# Patient Record
Sex: MALE | Race: BLACK OR AFRICAN AMERICAN | Employment: UNEMPLOYED | URBAN - METROPOLITAN AREA
[De-identification: names, ages, dates, MRNs, and addresses within clinical notes are randomized per-mention and may not be internally consistent; named-entity substitution may affect disease eponyms.]

---

## 2017-01-02 RX ORDER — OMEPRAZOLE 20 MG/1
CAPSULE, DELAYED RELEASE ORAL
Qty: 30 CAP | Refills: 5 | Status: SHIPPED | OUTPATIENT
Start: 2017-01-02 | End: 2017-07-03 | Stop reason: SDUPTHER

## 2017-01-11 ENCOUNTER — OFFICE VISIT (OUTPATIENT)
Dept: FAMILY MEDICINE CLINIC | Age: 52
End: 2017-01-11

## 2017-01-11 VITALS
BODY MASS INDEX: 32.73 KG/M2 | OXYGEN SATURATION: 98 % | RESPIRATION RATE: 12 BRPM | DIASTOLIC BLOOD PRESSURE: 80 MMHG | HEART RATE: 84 BPM | HEIGHT: 69 IN | TEMPERATURE: 98.7 F | SYSTOLIC BLOOD PRESSURE: 149 MMHG | WEIGHT: 221 LBS

## 2017-01-11 DIAGNOSIS — I10 ESSENTIAL HYPERTENSION: Primary | ICD-10-CM

## 2017-01-11 RX ORDER — AMLODIPINE AND BENAZEPRIL HYDROCHLORIDE 5; 20 MG/1; MG/1
1 CAPSULE ORAL DAILY
Qty: 30 CAP | Refills: 1 | Status: SHIPPED | OUTPATIENT
Start: 2017-01-11 | End: 2017-02-08 | Stop reason: DRUGHIGH

## 2017-01-11 RX ORDER — OLANZAPINE 5 MG/1
TABLET ORAL
COMMUNITY
End: 2018-09-14

## 2017-01-11 RX ORDER — HYDROCORTISONE 25 MG/G
CREAM TOPICAL 2 TIMES DAILY
COMMUNITY
End: 2017-08-21 | Stop reason: SDUPTHER

## 2017-01-11 NOTE — MR AVS SNAPSHOT
Visit Information Date & Time Provider Department Dept. Phone Encounter #  
 1/11/2017  8:30 AM Cosme Martinez NP [facility] [phone] [id number] Follow-up Instructions Return in about 1 month (around 2/11/2017) for HTN F/U . Upcoming Health Maintenance Date Due COLONOSCOPY 12/3/2025 DTaP/Tdap/Td series (2 - Td) 10/6/2026 Allergies as of 1/11/2017  Review Complete On: 1/11/2017 By: Roel Andrea LPN No Known Allergies Current Immunizations  Reviewed on 10/27/2015 Name Date Influenza Vaccine (Quad) PF 10/6/2016 PPD 1/25/2012, 9/19/2011, 8/2/2010 TB Skin Test (PPD) Intradermal 10/27/2015, 9/9/2013 Not reviewed this visit You Were Diagnosed With   
  
 Codes Comments Essential hypertension    -  Primary ICD-10-CM: I10 
ICD-9-CM: 401.9 Vitals BP Pulse Temp Resp Height(growth percentile) Weight(growth percentile) 149/80 84 98.7 °F (37.1 °C) 12 5' 9\" (1.753 m) 221 lb (100.2 kg) SpO2 BMI Smoking Status 98% 32.64 kg/m2 Former Smoker Vitals History BMI and BSA Data Body Mass Index Body Surface Area  
 32.64 kg/m 2 2.21 m 2 Preferred Pharmacy Pharmacy Name Phone [facility]  [phone] Your Updated Medication List  
  
   
This list is accurate as of: 1/11/17  8:31 AM.  Always use your most recent med list. amLODIPine-benazepril 5-20 mg per capsule Commonly known as:  Issa Ban Take 1 Cap by mouth daily. aspirin delayed-release 81 mg tablet Commonly known as:  ASPIR-LOW TAKE 1 TABLET BY MOUTH ONCE A DAY  
  
 clobetasol 0.05 % lotion Commonly known as:  CLOBEX  
APPLY TO AFFECTED AREA 2 TIMES A DAY AS DIRECTED. DEPAKOTE  mg ER tablet Generic drug:  divalproex ER Take  by mouth.  
  
 escitalopram oxalate 20 mg tablet Commonly known as:  Kary Cruz Take 1 Tab by mouth daily. Gauze Bandage 4 X 4 \" Bndg  
1 Each by Apply Externally route two (2) times a day. hydrocortisone 2.5 % topical cream  
Commonly known as:  HYTONE Apply  to affected area two (2) times a day. use thin layer  
  
 ketoconazole 2 % shampoo Commonly known as:  NIZORAL Apply  to affected area daily as needed. * LORazepam 1 mg tablet Commonly known as:  ATIVAN Take 1 Tab by mouth three (3) times daily. * LORazepam 2 mg tablet Commonly known as:  ATIVAN Take 2 tablets by mouth nightly as needed for Anxiety. MAX per day is 10mg  
  
 mometasone 0.1 % topical cream  
Commonly known as:  Jacobson Harrow Apply  to affected area daily. multivitamin with iron tablet Commonly known as:  TAB-A-SANTANA/IRON  
TAKE 1 TABLET BY MOUTH DAILY  
  
 mupirocin 2 % ointment Commonly known as:  [employer] Apply  to affected area daily as needed. * OLANZapine 15 mg tablet Commonly known as:  ZyPREXA Take 15 mg by mouth nightly. * OLANZapine 5 mg tablet Commonly known as:  ZyPREXA Take  by mouth nightly. omeprazole 20 mg capsule Commonly known as:  PRILOSEC  
TAKE ONE CAPSULE BY MOUTH DAILY  
  
 OTHER Attends extra large pull-up, use as needed. Dx: 318.0  
  
 * polyethylene glycol 17 gram packet Commonly known as:  Deliliah Hefty Take 1 Packet by mouth daily. * polyethylene glycol 17 gram/dose powder Commonly known as:  Deliliah Hefty MIX 17GM AS DIRECTED THREE TIMES A WEEK. QUEtiapine 400 mg tablet Commonly known as:  SEROquel  
  
 risperiDONE 4 mg tablet Commonly known as:  RisperDAL Take  by mouth.  
  
 sucralfate 1 gram tablet Commonly known as:  CARAFATE  
TAKE (1) TABLET BY MOUTH THREE TIMES DAILY. Therapy M 9 mg iron-400 mcg Tab tablet Generic drug:  multivitamin, tx-iron-ca-min TAKE ONE TABLET BY MOUTH DAILY. * Notice:   This list has 6 medication(s) that are the same as other medications prescribed for you. Read the directions carefully, and ask your doctor or other care provider to review them with you. Prescriptions Sent to Pharmacy Refills  
 amLODIPine-benazepril (LOTREL) 5-20 mg per capsule 1 Sig: Take 1 Cap by mouth daily. Class: Normal  
 Pharmacy: [facility]  #: [phone] Route: Oral  
  
Follow-up Instructions Return in about 1 month (around 2/11/2017) for HTN F/U . Patient Instructions High Blood Pressure: Care Instructions Your Care Instructions If your blood pressure is usually above 140/90, you have high blood pressure, or hypertension. That means the top number is 140 or higher or the bottom number is 90 or higher, or both. Despite what a lot of people think, high blood pressure usually doesn't cause headaches or make you feel dizzy or lightheaded. It usually has no symptoms. But it does increase your risk for heart attack, stroke, and kidney or eye damage. The higher your blood pressure, the more your risk increases. Your doctor will give you a goal for your blood pressure. Your goal will be based on your health and your age. An example of a goal is to keep your blood pressure below 140/90. Lifestyle changes, such as eating healthy and being active, are always important to help lower blood pressure. You might also take medicine to reach your blood pressure goal. 
Follow-up care is a key part of your treatment and safety. Be sure to make and go to all appointments, and call your doctor if you are having problems. It's also a good idea to know your test results and keep a list of the medicines you take. How can you care for yourself at home? Medical treatment · If you stop taking your medicine, your blood pressure will go back up. You may take one or more types of medicine to lower your blood pressure. Be safe with medicines. Take your medicine exactly as prescribed. Call your doctor if you think you are having a problem with your medicine. · Talk to your doctor before you start taking aspirin every day. Aspirin can help certain people lower their risk of a heart attack or stroke. But taking aspirin isn't right for everyone, because it can cause serious bleeding. · See your doctor regularly. You may need to see the doctor more often at first or until your blood pressure comes down. · If you are taking blood pressure medicine, talk to your doctor before you take decongestants or anti-inflammatory medicine, such as ibuprofen. Some of these medicines can raise blood pressure. · Learn how to check your blood pressure at home. Lifestyle changes · Stay at a healthy weight. This is especially important if you put on weight around the waist. Losing even 10 pounds can help you lower your blood pressure. · If your doctor recommends it, get more exercise. Walking is a good choice. Bit by bit, increase the amount you walk every day. Try for at least 30 minutes on most days of the week. You also may want to swim, bike, or do other activities. · Avoid or limit alcohol. Talk to your doctor about whether you can drink any alcohol. · Try to limit how much sodium you eat to less than 2,300 milligrams (mg) a day. Your doctor may ask you to try to eat less than 1,500 mg a day. · Eat plenty of fruits (such as bananas and oranges), vegetables, legumes, whole grains, and low-fat dairy products. · Lower the amount of saturated fat in your diet. Saturated fat is found in animal products such as milk, cheese, and meat. Limiting these foods may help you lose weight and also lower your risk for heart disease. · Do not smoke. Smoking increases your risk for heart attack and stroke. If you need help quitting, talk to your doctor about stop-smoking programs and medicines. These can increase your chances of quitting for good. When should you call for help? Call 911 anytime you think you may need emergency care. This may mean having symptoms that suggest that your blood pressure is causing a serious heart or blood vessel problem. Your blood pressure may be over 180/110. For example, call 911 if: 
· You have symptoms of a heart attack. These may include: ¨ Chest pain or pressure, or a strange feeling in the chest. 
¨ Sweating. ¨ Shortness of breath. ¨ Nausea or vomiting. ¨ Pain, pressure, or a strange feeling in the back, neck, jaw, or upper belly or in one or both shoulders or arms. ¨ Lightheadedness or sudden weakness. ¨ A fast or irregular heartbeat. · You have symptoms of a stroke. These may include: 
¨ Sudden numbness, tingling, weakness, or loss of movement in your face, arm, or leg, especially on only one side of your body. ¨ Sudden vision changes. ¨ Sudden trouble speaking. ¨ Sudden confusion or trouble understanding simple statements. ¨ Sudden problems with walking or balance. ¨ A sudden, severe headache that is different from past headaches. · You have severe back or belly pain. Do not wait until your blood pressure comes down on its own. Get help right away. Call your doctor now or seek immediate care if: 
· Your blood pressure is much higher than normal (such as 180/110 or higher), but you don't have symptoms. · You think high blood pressure is causing symptoms, such as: ¨ Severe headache. ¨ Blurry vision. Watch closely for changes in your health, and be sure to contact your doctor if: 
· Your blood pressure measures 140/90 or higher at least 2 times. That means the top number is 140 or higher or the bottom number is 90 or higher, or both. · You think you may be having side effects from your blood pressure medicine. · Your blood pressure is usually normal, but it goes above normal at least 2 times. Where can you learn more? Go to http://jaren-chucho.info/. Enter H616 in the search box to learn more about \"High Blood Pressure: Care Instructions. \" Current as of: August 8, 2016 Content Version: 11.1 © 8473-4383 [employer]. Care instructions adapted under license by [employer] (which disclaims liability or warranty for this information). If you have questions about a medical condition or this instruction, always ask your healthcare professional. [address] any warranty or liability for your use of this information. Introducing [facility]! Dear Allison Ghosh: Thank you for requesting a Downstream account. Our records indicate that you have previously registered for a Downstream account but its currently inactive. Please call our Downstream support line at [phone]. Additional Information If you have questions, please visit the Frequently Asked Questions section of the Downstream website at [email or web address] Remember, Downstream is NOT to be used for urgent needs. For medical emergencies, dial 911. Now available from your iPhone and Android! Please provide this summary of care documentation to your next provider. Your primary care clinician is listed as ALEXIS KIDD. If you have any questions after today's visit, please call [phone].

## 2017-01-11 NOTE — PATIENT INSTRUCTIONS

## 2017-01-11 NOTE — PROGRESS NOTES
Chief Complaint   Patient presents with    Blood Pressure Check     he is a 46y.o. year old male who presents for evalution. Pt here for HTN F/U. Medication increased at last visit. Has long of BP readings from morning - majority are above 140/90. Pt has not seemed to have problems with chest pain, SOB, dizziness, or vision changes. Reviewed PmHx, RxHx, FmHx, SocHx, AllgHx and updated and dated in the chart. Review of Systems - negative except as listed above in the HPI    Objective:     Vitals:    01/11/17 0812   BP: 149/80   Pulse: 84   Resp: 12   Temp: 98.7 °F (37.1 °C)   SpO2: 98%   Weight: 221 lb (100.2 kg)   Height: 5' 9\" (1.753 m)     Physical Examination: General appearance - alert, well appearing, and in no distress  Chest - clear to auscultation, no wheezes, rales or rhonchi, symmetric air entry  Heart - normal rate, regular rhythm, normal S1, S2, no murmurs, rubs, clicks or gallops  Extremities - peripheral pulses normal, no pedal edema, no clubbing or cyanosis    Assessment/ Plan:   Taryn Conde was seen today for blood pressure check. Diagnoses and all orders for this visit:    Essential hypertension  -     amLODIPine-benazepril (LOTREL) 5-20 mg per capsule; Take 1 Cap by mouth daily. New rx, D/C Lisinopril. F/U 1 mo. Encouraged pt to monitor BP at home, preferably in AM when first wakes up. Goal BP is < 130/90 if on meds. Discussed consequences of uncontrolled HTN and need for yearly eye exam. Recommended pt limit salt intake and engage in regular exercise. Pt voiced understanding regarding plan of care. Follow-up Disposition:  Return in about 1 month (around 2/11/2017) for HTN F/U . I have discussed the diagnosis with the patient and the intended plan as seen in the above orders. The patient has received an after-visit summary and questions were answered concerning future plans.      Medication Side Effects and Warnings were discussed with patient    Diego Lawson, NP

## 2017-01-23 ENCOUNTER — DOCUMENTATION ONLY (OUTPATIENT)
Dept: FAMILY MEDICINE CLINIC | Age: 52
End: 2017-01-23

## 2017-01-25 ENCOUNTER — DOCUMENTATION ONLY (OUTPATIENT)
Dept: FAMILY MEDICINE CLINIC | Age: 52
End: 2017-01-25

## 2017-02-01 ENCOUNTER — DOCUMENTATION ONLY (OUTPATIENT)
Dept: FAMILY MEDICINE CLINIC | Age: 52
End: 2017-02-01

## 2017-02-02 ENCOUNTER — DOCUMENTATION ONLY (OUTPATIENT)
Dept: FAMILY MEDICINE CLINIC | Age: 52
End: 2017-02-02

## 2017-02-03 ENCOUNTER — DOCUMENTATION ONLY (OUTPATIENT)
Dept: FAMILY MEDICINE CLINIC | Age: 52
End: 2017-02-03

## 2017-02-06 ENCOUNTER — DOCUMENTATION ONLY (OUTPATIENT)
Dept: FAMILY MEDICINE CLINIC | Age: 52
End: 2017-02-06

## 2017-02-06 RX ORDER — MULTIPLE VITAMINS W/ MINERALS TAB 9MG-400MCG
TAB ORAL
Qty: 30 TAB | Refills: 2 | Status: SHIPPED | OUTPATIENT
Start: 2017-02-06 | End: 2017-10-16 | Stop reason: SDUPTHER

## 2017-02-08 ENCOUNTER — OFFICE VISIT (OUTPATIENT)
Dept: FAMILY MEDICINE CLINIC | Age: 52
End: 2017-02-08

## 2017-02-08 VITALS
DIASTOLIC BLOOD PRESSURE: 77 MMHG | RESPIRATION RATE: 12 BRPM | OXYGEN SATURATION: 98 % | SYSTOLIC BLOOD PRESSURE: 125 MMHG | WEIGHT: 228 LBS | BODY MASS INDEX: 33.77 KG/M2 | TEMPERATURE: 97.9 F | HEART RATE: 89 BPM | HEIGHT: 69 IN

## 2017-02-08 DIAGNOSIS — I10 ESSENTIAL HYPERTENSION: Primary | ICD-10-CM

## 2017-02-08 RX ORDER — AMLODIPINE AND BENAZEPRIL HYDROCHLORIDE 10; 20 MG/1; MG/1
1 CAPSULE ORAL DAILY
Qty: 30 CAP | Refills: 1 | Status: SHIPPED | OUTPATIENT
Start: 2017-02-08 | End: 2017-05-03 | Stop reason: SDUPTHER

## 2017-02-08 NOTE — PROGRESS NOTES
Chief Complaint   Patient presents with    Follow Up Chronic Condition    Blood Pressure Check     he is a 46y.o. year old male who presents for evalution. Pt here for HTN F/U. Medication changed at last appt. Numbers continue to be elevated at home - mostly 140s/high 90s. Well controlled here. No complaints of chest pain or SOB. Pt very grumpy today, doesn't want to be here. Reviewed PmHx, RxHx, FmHx, SocHx, AllgHx and updated and dated in the chart. Review of Systems - negative except as listed above in the HPI    Objective:     Vitals:    02/08/17 0756   BP: 125/77   Pulse: 89   Resp: 12   Temp: 97.9 °F (36.6 °C)   SpO2: 98%   Weight: 228 lb (103.4 kg)   Height: 5' 9\" (1.753 m)     Physical Examination: General appearance - alert, well appearing, and in no distress  Chest - clear to auscultation, no wheezes, rales or rhonchi, symmetric air entry  Heart - normal rate, regular rhythm, normal S1, S2, no murmurs, rubs, clicks or gallops    Assessment/ Plan:   Lara Bowie was seen today for follow up chronic condition and blood pressure check. Diagnoses and all orders for this visit:    Essential hypertension  -     amLODIPine-benazepril (LOTREL) 10-20 mg per capsule; Take 1 Cap by mouth daily. Encouraged pt to monitor BP at home, preferably in AM when first wakes up. Goal BP is < 130/90 if on meds. Discussed consequences of uncontrolled HTN and need for yearly eye exam. Recommended pt limit salt intake and engage in regular exercise. F/U 1 mo. Pt voiced understanding regarding plan of care. Follow-up Disposition:  Return in about 1 month (around 3/8/2017) for HTN. I have discussed the diagnosis with the patient and the intended plan as seen in the above orders. The patient has received an after-visit summary and questions were answered concerning future plans.      Medication Side Effects and Warnings were discussed with patient    Forest Oneal NP

## 2017-02-08 NOTE — MR AVS SNAPSHOT
Visit Information Date & Time Provider Department Dept. Phone Encounter #  
 2/8/2017  7:45 AM Emerson Sills, ABBY [address] [facility] [phone] [id number] Follow-up Instructions Return in about 1 month (around 3/8/2017) for HTN. Upcoming Health Maintenance Date Due COLONOSCOPY 12/3/2025 DTaP/Tdap/Td series (2 - Td) 10/6/2026 Allergies as of 2/8/2017  Review Complete On: 2/8/2017 By: Celso Cedeno LPN No Known Allergies Current Immunizations  Reviewed on 10/27/2015 Name Date Influenza Vaccine (Quad) PF 10/6/2016 PPD 1/25/2012, 9/19/2011, 8/2/2010 TB Skin Test (PPD) Intradermal 10/27/2015, 9/9/2013 Not reviewed this visit You Were Diagnosed With   
  
 Codes Comments Essential hypertension    -  Primary ICD-10-CM: I10 
ICD-9-CM: 401.9 Vitals BP Pulse Temp Resp Height(growth percentile) Weight(growth percentile) 125/77 89 97.9 °F (36.6 °C) 12 5' 9\" (1.753 m) 228 lb (103.4 kg) SpO2 BMI Smoking Status 98% 33.67 kg/m2 Former Smoker Vitals History BMI and BSA Data Body Mass Index Body Surface Area  
 33.67 kg/m 2 2.24 m 2 Preferred Pharmacy Pharmacy Name Phone [facility] [phone] Your Updated Medication List  
  
   
This list is accurate as of: 2/8/17  8:08 AM.  Always use your most recent med list. amLODIPine-benazepril 10-20 mg per capsule Commonly known as:  Laura Furlough Take 1 Cap by mouth daily. aspirin delayed-release 81 mg tablet Commonly known as:  ASPIR-LOW TAKE 1 TABLET BY MOUTH ONCE A DAY  
  
 clobetasol 0.05 % lotion Commonly known as:  CLOBEX  
APPLY TO AFFECTED AREA 2 TIMES A DAY AS DIRECTED. DEPAKOTE  mg ER tablet Generic drug:  divalproex ER Take  by mouth.  
  
 escitalopram oxalate 20 mg tablet Commonly known as:  Arty Heflin Take 1 Tab by mouth daily. Gauze Bandage 4 X 4 \" Bndg  
1 Each by Apply Externally route two (2) times a day. hydrocortisone 2.5 % topical cream  
Commonly known as:  HYTONE Apply  to affected area two (2) times a day. use thin layer  
  
 ketoconazole 2 % shampoo Commonly known as:  NIZORAL Apply  to affected area daily as needed. * LORazepam 1 mg tablet Commonly known as:  ATIVAN Take 1 Tab by mouth three (3) times daily. * LORazepam 2 mg tablet Commonly known as:  ATIVAN Take 2 tablets by mouth nightly as needed for Anxiety. MAX per day is 10mg  
  
 mometasone 0.1 % topical cream  
Commonly known as:  Geri July Apply  to affected area daily. multivitamin with iron tablet Commonly known as:  TAB-A-SANTANA/IRON  
TAKE 1 TABLET BY MOUTH DAILY  
  
 mupirocin 2 % ointment Commonly known as:  [facility] Apply  to affected area daily as needed. * OLANZapine 15 mg tablet Commonly known as:  ZyPREXA Take 15 mg by mouth nightly. * OLANZapine 5 mg tablet Commonly known as:  ZyPREXA Take  by mouth nightly. omeprazole 20 mg capsule Commonly known as:  PRILOSEC  
TAKE ONE CAPSULE BY MOUTH DAILY  
  
 OTHER Attends extra large pull-up, use as needed. Dx: 318.0  
  
 polyethylene glycol 17 gram packet Commonly known as:  Ethan Armor Take 1 Packet by mouth daily. QUEtiapine 400 mg tablet Commonly known as:  SEROquel  
  
 risperiDONE 4 mg tablet Commonly known as:  RisperDAL Take  by mouth.  
  
 sucralfate 1 gram tablet Commonly known as:  CARAFATE  
TAKE (1) TABLET BY MOUTH THREE TIMES DAILY. Therapy M 9 mg iron-400 mcg Tab tablet Generic drug:  multivitamin, tx-iron-ca-min TAKE ONE TABLET BY MOUTH DAILY. * Notice: This list has 4 medication(s) that are the same as other medications prescribed for you. Read the directions carefully, and ask your doctor or other care provider to review them with you. Prescriptions Sent to Pharmacy Refills  
 amLODIPine-benazepril (LOTREL) 10-20 mg per capsule 1 Sig: Take 1 Cap by mouth daily. Class: Normal  
 Pharmacy: [facility] #: [phone] Route: Oral  
  
Follow-up Instructions Return in about 1 month (around 3/8/2017) for HTN. Patient Instructions Learning About High Blood Pressure What is high blood pressure? Blood pressure is a measure of how hard the blood pushes against the walls of your arteries. It's normal for blood pressure to go up and down throughout the day, but if it stays up, you have high blood pressure. Another name for high blood pressure is hypertension. Two numbers tell you your blood pressure. The first number is the systolic pressure. It shows how hard the blood pushes when your heart is pumping. The second number is the diastolic pressure. It shows how hard the blood pushes between heartbeats, when your heart is relaxed and filling with blood. A blood pressure of less than 120/80 (say \"120 over 80\") is ideal for an adult. High blood pressure is 140/90 or higher. You have high blood pressure if your top number is 140 or higher or your bottom number is 90 or higher, or both. Many people fall into the category in between, called prehypertension. People with prehypertension need to make lifestyle changes to bring their blood pressure down and help prevent or delay high blood pressure. What happens when you have high blood pressure? · Blood flows through your arteries with too much force. Over time, this damages the walls of your arteries. But you can't feel it. High blood pressure usually doesn't cause symptoms. · Fat and calcium start to build up in your arteries. This buildup is called plaque. Plaque makes your arteries narrower and stiffer. Blood can't flow through them as easily.  
· This lack of good blood flow starts to damage some of the organs in your body. This can lead to problems such as coronary artery disease and heart attack, heart failure, stroke, kidney failure, and eye damage. How can you prevent high blood pressure? · Stay at a healthy weight. · Try to limit how much sodium you eat to less than 2,300 milligrams (mg) a day. If you limit your sodium to 1,500 mg a day, you can lower your blood pressure even more. ¨ Buy foods that are labeled \"unsalted,\" \"sodium-free,\" or \"low-sodium. \" Foods labeled \"reduced-sodium\" and \"light sodium\" may still have too much sodium. ¨ Flavor your food with garlic, lemon juice, onion, vinegar, herbs, and spices instead of salt. Do not use soy sauce, steak sauce, onion salt, garlic salt, mustard, or ketchup on your food. ¨ Use less salt (or none) when recipes call for it. You can often use half the salt a recipe calls for without losing flavor. · Be physically active. Get at least 30 minutes of exercise on most days of the week. Walking is a good choice. You also may want to do other activities, such as running, swimming, cycling, or playing tennis or team sports. · Limit alcohol to 2 drinks a day for men and 1 drink a day for women. · Eat plenty of fruits, vegetables, and low-fat dairy products. Eat less saturated and total fats. How is high blood pressure treated? · Your doctor will suggest making lifestyle changes. For example, your doctor may ask you to eat healthy foods, quit smoking, lose extra weight, and be more active. · If lifestyle changes don't help enough or your blood pressure is very high, you will have to take medicine every day. Follow-up care is a key part of your treatment and safety. Be sure to make and go to all appointments, and call your doctor if you are having problems. It's also a good idea to know your test results and keep a list of the medicines you take. Where can you learn more? Go to http://pranav.info/. Enter P501 in the search box to learn more about \"Learning About High Blood Pressure. \" Current as of: March 23, 2016 Content Version: 11.1 © 9238-9441 [employer]. Care instructions adapted under license by [employer] (which disclaims liability or warranty for this information). If you have questions about a medical condition or this instruction, always ask your healthcare professional. [address] any warranty or liability for your use of this information. Introducing [facility]! Dear Dot Frankel: Thank you for requesting a Exabre account. Our records indicate that you have previously registered for a Exabre account but its currently inactive. Please call our Exabre support line at [phone]. Additional Information If you have questions, please visit the Frequently Asked Questions section of the Exabre website at [email or web address] Remember, Exabre is NOT to be used for urgent needs. For medical emergencies, dial 911. Now available from your iPhone and Android! Please provide this summary of care documentation to your next provider. Your primary care clinician is listed as ALEXIS KIDD. If you have any questions after today's visit, please call [phone].

## 2017-02-08 NOTE — PATIENT INSTRUCTIONS
Learning About High Blood Pressure  What is high blood pressure? Blood pressure is a measure of how hard the blood pushes against the walls of your arteries. It's normal for blood pressure to go up and down throughout the day, but if it stays up, you have high blood pressure. Another name for high blood pressure is hypertension. Two numbers tell you your blood pressure. The first number is the systolic pressure. It shows how hard the blood pushes when your heart is pumping. The second number is the diastolic pressure. It shows how hard the blood pushes between heartbeats, when your heart is relaxed and filling with blood. A blood pressure of less than 120/80 (say \"120 over 80\") is ideal for an adult. High blood pressure is 140/90 or higher. You have high blood pressure if your top number is 140 or higher or your bottom number is 90 or higher, or both. Many people fall into the category in between, called prehypertension. People with prehypertension need to make lifestyle changes to bring their blood pressure down and help prevent or delay high blood pressure. What happens when you have high blood pressure? · Blood flows through your arteries with too much force. Over time, this damages the walls of your arteries. But you can't feel it. High blood pressure usually doesn't cause symptoms. · Fat and calcium start to build up in your arteries. This buildup is called plaque. Plaque makes your arteries narrower and stiffer. Blood can't flow through them as easily. · This lack of good blood flow starts to damage some of the organs in your body. This can lead to problems such as coronary artery disease and heart attack, heart failure, stroke, kidney failure, and eye damage. How can you prevent high blood pressure? · Stay at a healthy weight. · Try to limit how much sodium you eat to less than 2,300 milligrams (mg) a day.  If you limit your sodium to 1,500 mg a day, you can lower your blood pressure even more.  ¨ Buy foods that are labeled \"unsalted,\" \"sodium-free,\" or \"low-sodium. \" Foods labeled \"reduced-sodium\" and \"light sodium\" may still have too much sodium. ¨ Flavor your food with garlic, lemon juice, onion, vinegar, herbs, and spices instead of salt. Do not use soy sauce, steak sauce, onion salt, garlic salt, mustard, or ketchup on your food. ¨ Use less salt (or none) when recipes call for it. You can often use half the salt a recipe calls for without losing flavor. · Be physically active. Get at least 30 minutes of exercise on most days of the week. Walking is a good choice. You also may want to do other activities, such as running, swimming, cycling, or playing tennis or team sports. · Limit alcohol to 2 drinks a day for men and 1 drink a day for women. · Eat plenty of fruits, vegetables, and low-fat dairy products. Eat less saturated and total fats. How is high blood pressure treated? · Your doctor will suggest making lifestyle changes. For example, your doctor may ask you to eat healthy foods, quit smoking, lose extra weight, and be more active. · If lifestyle changes don't help enough or your blood pressure is very high, you will have to take medicine every day. Follow-up care is a key part of your treatment and safety. Be sure to make and go to all appointments, and call your doctor if you are having problems. It's also a good idea to know your test results and keep a list of the medicines you take. Where can you learn more? Go to http://jaren-chucho.info/. Enter P501 in the search box to learn more about \"Learning About High Blood Pressure. \"  Current as of: March 23, 2016  Content Version: 11.1  © 9274-4697 [employer]. Care instructions adapted under license by [employer] (which disclaims liability or warranty for this information).  If you have questions about a medical condition or this instruction, always ask your healthcare professional. [employer] disclaims any warranty or liability for your use of this information.

## 2017-02-16 RX ORDER — CLOBETASOL PROPIONATE 0.5 MG/ML
LOTION TOPICAL
Qty: 50 ML | Refills: 5 | Status: SHIPPED | OUTPATIENT
Start: 2017-02-16 | End: 2017-08-18 | Stop reason: SDUPTHER

## 2017-02-16 RX ORDER — POLYETHYLENE GLYCOL 3350 17 G/17G
POWDER, FOR SOLUTION ORAL
Qty: 527 G | Refills: 5 | Status: SHIPPED | OUTPATIENT
Start: 2017-02-16 | End: 2017-08-18 | Stop reason: SDUPTHER

## 2017-03-08 ENCOUNTER — HOSPITAL ENCOUNTER (OUTPATIENT)
Dept: LAB | Age: 52
Discharge: HOME | End: 2017-03-08
Payer: MEDICARE

## 2017-03-08 ENCOUNTER — OFFICE VISIT (OUTPATIENT)
Dept: FAMILY MEDICINE CLINIC | Age: 52
End: 2017-03-08

## 2017-03-08 VITALS
OXYGEN SATURATION: 99 % | SYSTOLIC BLOOD PRESSURE: 139 MMHG | WEIGHT: 227 LBS | BODY MASS INDEX: 33.62 KG/M2 | HEART RATE: 88 BPM | TEMPERATURE: 98.7 F | DIASTOLIC BLOOD PRESSURE: 81 MMHG | RESPIRATION RATE: 12 BRPM | HEIGHT: 69 IN

## 2017-03-08 DIAGNOSIS — I10 ESSENTIAL HYPERTENSION: Primary | ICD-10-CM

## 2017-03-08 DIAGNOSIS — E78.5 HYPERLIPIDEMIA, UNSPECIFIED HYPERLIPIDEMIA TYPE: ICD-10-CM

## 2017-03-08 DIAGNOSIS — L98.9 SKIN DISORDER: ICD-10-CM

## 2017-03-08 PROCEDURE — 80061 LIPID PANEL: CPT

## 2017-03-08 PROCEDURE — 80053 COMPREHEN METABOLIC PANEL: CPT

## 2017-03-08 RX ORDER — CLOTRIMAZOLE AND BETAMETHASONE DIPROPIONATE 10; .64 MG/G; MG/G
CREAM TOPICAL
Qty: 30 G | Refills: 0 | Status: SHIPPED | OUTPATIENT
Start: 2017-03-08 | End: 2018-01-31

## 2017-03-08 NOTE — MR AVS SNAPSHOT
Visit Information Date & Time Provider Department Dept. Phone Encounter #  
 3/8/2017  7:45 AM Aimee Trammell NP [address] [facility] [phone] [id number] Upcoming Health Maintenance Date Due COLONOSCOPY 12/3/2025 DTaP/Tdap/Td series (2 - Td) 10/6/2026 Allergies as of 3/8/2017  Review Complete On: 3/8/2017 By: Margene Angelucci, LPN No Known Allergies Current Immunizations  Reviewed on 10/27/2015 Name Date Influenza Vaccine (Quad) PF 10/6/2016 PPD 1/25/2012, 9/19/2011, 8/2/2010 TB Skin Test (PPD) Intradermal 10/27/2015, 9/9/2013 Not reviewed this visit You Were Diagnosed With   
  
 Codes Comments Essential hypertension    -  Primary ICD-10-CM: I10 
ICD-9-CM: 401.9 Hyperlipidemia, unspecified hyperlipidemia type     ICD-10-CM: E78.5 ICD-9-CM: 272.4 Skin disorder     ICD-10-CM: L98.9 ICD-9-CM: 709.9 Vitals BP Pulse Temp Resp Height(growth percentile) Weight(growth percentile) 139/81 88 98.7 °F (37.1 °C) 12 5' 9\" (1.753 m) 227 lb (103 kg) SpO2 BMI Smoking Status 99% 33.52 kg/m2 Former Smoker Vitals History BMI and BSA Data Body Mass Index Body Surface Area  
 33.52 kg/m 2 2.24 m 2 Preferred Pharmacy Pharmacy Name Phone [facility]  [phone] Your Updated Medication List  
  
   
This list is accurate as of: 3/8/17  8:15 AM.  Always use your most recent med list. amLODIPine-benazepril 10-20 mg per capsule Commonly known as:  Margarite Chavez Take 1 Cap by mouth daily. aspirin delayed-release 81 mg tablet Commonly known as:  ASPIR-LOW TAKE 1 TABLET BY MOUTH ONCE A DAY  
  
 clobetasol 0.05 % lotion Commonly known as:  CLOBEX  
APPLY TO AFFECTED AREA 2 TIMES A DAY AS DIRECTED.   
  
 clotrimazole-betamethasone topical cream  
Commonly known as:  Steve Eugene  
 Apply lotion to bilateral thighs BID x 1-2 weeks DEPAKOTE  mg ER tablet Generic drug:  divalproex ER Take  by mouth.  
  
 escitalopram oxalate 20 mg tablet Commonly known as:  Arturo Hands Take 1 Tab by mouth daily. Gauze Bandage 4 X 4 \" Bndg  
1 Each by Apply Externally route two (2) times a day. hydrocortisone 2.5 % topical cream  
Commonly known as:  HYTONE Apply  to affected area two (2) times a day. use thin layer  
  
 ketoconazole 2 % shampoo Commonly known as:  NIZORAL Apply  to affected area daily as needed. * LORazepam 1 mg tablet Commonly known as:  ATIVAN Take 1 Tab by mouth three (3) times daily. * LORazepam 2 mg tablet Commonly known as:  ATIVAN Take 2 tablets by mouth nightly as needed for Anxiety. MAX per day is 10mg  
  
 mometasone 0.1 % topical cream  
Commonly known as:  Lindsey Gunner Apply  to affected area daily. multivitamin with iron tablet Commonly known as:  TAB-A-SANTANA/IRON  
TAKE 1 TABLET BY MOUTH DAILY  
  
 mupirocin 2 % ointment Commonly known as:  [employer] Apply  to affected area daily as needed. * OLANZapine 15 mg tablet Commonly known as:  ZyPREXA Take 15 mg by mouth nightly. * OLANZapine 5 mg tablet Commonly known as:  ZyPREXA Take  by mouth nightly. omeprazole 20 mg capsule Commonly known as:  PRILOSEC  
TAKE ONE CAPSULE BY MOUTH DAILY  
  
 OTHER Attends extra large pull-up, use as needed. Dx: 318.0  
  
 * polyethylene glycol 17 gram packet Commonly known as:  Maura Floor Take 1 Packet by mouth daily. * polyethylene glycol 17 gram/dose powder Commonly known as:  Maura Floor MIX 17 GRAMS WITH JUICE/WATER AND DRINK THREE TIMES A WEEK. QUEtiapine 400 mg tablet Commonly known as:  SEROquel  
  
 risperiDONE 4 mg tablet Commonly known as:  RisperDAL Take  by mouth.  
  
 sucralfate 1 gram tablet Commonly known as:  CARAFATE  
TAKE (1) TABLET BY MOUTH THREE TIMES DAILY. Therapy M 9 mg iron-400 mcg Tab tablet Generic drug:  multivitamin, tx-iron-ca-min TAKE ONE TABLET BY MOUTH DAILY. * Notice: This list has 6 medication(s) that are the same as other medications prescribed for you. Read the directions carefully, and ask your doctor or other care provider to review them with you. Prescriptions Sent to Pharmacy Refills  
 clotrimazole-betamethasone (LOTRISONE) topical cream 0 Sig: Apply lotion to bilateral thighs BID x 1-2 weeks Class: Normal  
 Pharmacy: [address] [place] [address] [address] #: [phone] We Performed the Following LIPID PANEL [95288 CPT(R)] METABOLIC PANEL, COMPREHENSIVE [30824 CPT(R)] REFERRAL TO OPTOMETRY [id number] Custom] Comments:  
 Please evaluate patient for HTN Referral Information Referral ID Referred By Referred To  
  
 [id number] Chanell MASTERS Not Available Visits Status Start Date End Date 1 New Request 3/8/17 3/8/18 If your referral has a status of pending review or denied, additional information will be sent to support the outcome of this decision. Patient Instructions High Blood Pressure: Care Instructions Your Care Instructions If your blood pressure is usually above 140/90, you have high blood pressure, or hypertension. That means the top number is 140 or higher or the bottom number is 90 or higher, or both. Despite what a lot of people think, high blood pressure usually doesn't cause headaches or make you feel dizzy or lightheaded. It usually has no symptoms. But it does increase your risk for heart attack, stroke, and kidney or eye damage. The higher your blood pressure, the more your risk increases. Your doctor will give you a goal for your blood pressure. Your goal will be based on your health and your age. An example of a goal is to keep your blood pressure below 140/90. Lifestyle changes, such as eating healthy and being active, are always important to help lower blood pressure. You might also take medicine to reach your blood pressure goal. 
Follow-up care is a key part of your treatment and safety. Be sure to make and go to all appointments, and call your doctor if you are having problems. It's also a good idea to know your test results and keep a list of the medicines you take. How can you care for yourself at home? Medical treatment · If you stop taking your medicine, your blood pressure will go back up. You may take one or more types of medicine to lower your blood pressure. Be safe with medicines. Take your medicine exactly as prescribed. Call your doctor if you think you are having a problem with your medicine. · Talk to your doctor before you start taking aspirin every day. Aspirin can help certain people lower their risk of a heart attack or stroke. But taking aspirin isn't right for everyone, because it can cause serious bleeding. · See your doctor regularly. You may need to see the doctor more often at first or until your blood pressure comes down. · If you are taking blood pressure medicine, talk to your doctor before you take decongestants or anti-inflammatory medicine, such as ibuprofen. Some of these medicines can raise blood pressure. · Learn how to check your blood pressure at home. Lifestyle changes · Stay at a healthy weight. This is especially important if you put on weight around the waist. Losing even 10 pounds can help you lower your blood pressure. · If your doctor recommends it, get more exercise. Walking is a good choice. Bit by bit, increase the amount you walk every day. Try for at least 30 minutes on most days of the week. You also may want to swim, bike, or do other activities. · Avoid or limit alcohol. Talk to your doctor about whether you can drink any alcohol.  
· Try to limit how much sodium you eat to less than 2,300 milligrams (mg) a day. Your doctor may ask you to try to eat less than 1,500 mg a day. · Eat plenty of fruits (such as bananas and oranges), vegetables, legumes, whole grains, and low-fat dairy products. · Lower the amount of saturated fat in your diet. Saturated fat is found in animal products such as milk, cheese, and meat. Limiting these foods may help you lose weight and also lower your risk for heart disease. · Do not smoke. Smoking increases your risk for heart attack and stroke. If you need help quitting, talk to your doctor about stop-smoking programs and medicines. These can increase your chances of quitting for good. When should you call for help? Call 911 anytime you think you may need emergency care. This may mean having symptoms that suggest that your blood pressure is causing a serious heart or blood vessel problem. Your blood pressure may be over 180/110. For example, call 911 if: 
· You have symptoms of a heart attack. These may include: ¨ Chest pain or pressure, or a strange feeling in the chest. 
¨ Sweating. ¨ Shortness of breath. ¨ Nausea or vomiting. ¨ Pain, pressure, or a strange feeling in the back, neck, jaw, or upper belly or in one or both shoulders or arms. ¨ Lightheadedness or sudden weakness. ¨ A fast or irregular heartbeat. · You have symptoms of a stroke. These may include: 
¨ Sudden numbness, tingling, weakness, or loss of movement in your face, arm, or leg, especially on only one side of your body. ¨ Sudden vision changes. ¨ Sudden trouble speaking. ¨ Sudden confusion or trouble understanding simple statements. ¨ Sudden problems with walking or balance. ¨ A sudden, severe headache that is different from past headaches. · You have severe back or belly pain. Do not wait until your blood pressure comes down on its own. Get help right away.  
Call your doctor now or seek immediate care if: 
· Your blood pressure is much higher than normal (such as 180/110 or higher), but you don't have symptoms. · You think high blood pressure is causing symptoms, such as: ¨ Severe headache. ¨ Blurry vision. Watch closely for changes in your health, and be sure to contact your doctor if: 
· Your blood pressure measures 140/90 or higher at least 2 times. That means the top number is 140 or higher or the bottom number is 90 or higher, or both. · You think you may be having side effects from your blood pressure medicine. · Your blood pressure is usually normal, but it goes above normal at least 2 times. Where can you learn more? Go to http://jaren-chucho.info/. Enter Z320 in the search box to learn more about \"High Blood Pressure: Care Instructions. \" Current as of: August 8, 2016 Content Version: 11.1 © 9333-7556 [employer] Care instructions adapted under license by [employer] (which disclaims liability or warranty for this information). If you have questions about a medical condition or this instruction, always ask your healthcare professional. [place] [address] any warranty or liability for your use of this information. Introducing [facility]! Dear Ian Flores: Thank you for requesting a [employer] account. Our records indicate that you have previously registered for a [employer] account but its currently inactive. Please call our [employer] support line at [phone]. Additional Information If you have questions, please visit the Frequently Asked Questions section of the [employer] website at [email or web address] Remember, [employer] is NOT to be used for urgent needs. For medical emergencies, dial 911. Now available from your iPhone and Android! Please provide this summary of care documentation to your next provider. Your primary care clinician is listed as ALEXIS KIDD. If you have any questions after today's visit, please call [phone].

## 2017-03-08 NOTE — PATIENT INSTRUCTIONS

## 2017-03-08 NOTE — PROGRESS NOTES
Chief Complaint   Patient presents with    Blood [address]     he is a 46y.o. year old male who presents for evalution. Pt here for BP recheck, has been well controlled at home on current medication. Numbers typically in 120s/80s. Also here for fasting labs. Unsure when last eye exam was, doesn't think in past year. Pt has had rash on thighs for past few weeks. Course is increasing. Doesn't seem to bother patient. Reviewed PmHx, RxHx, FmHx, SocHx, AllgHx and updated and dated in the chart. Review of Systems - negative except as listed above in the HPI    Objective:     Vitals:    03/08/17 0803   BP: 139/81   Pulse: 88   Resp: 12   Temp: 98.7 °F (37.1 °C)   SpO2: 99%   Weight: 227 lb (103 kg)   Height: 5' 9\" (1.753 m)     Physical Examination: General appearance - alert, well appearing, and in no distress  Chest - clear to auscultation, no wheezes, rales or rhonchi, symmetric air entry  Heart - normal rate, regular rhythm, normal S1, S2, no murmurs, rubs, clicks or gallops  Skin - bilateral thighs have circular patches with scaling skin and active borders     Assessment/ Plan:   Jasmeet Eason was seen today for blood pressure check and labs. Diagnoses and all orders for this visit:    Essential hypertension  -     REFERRAL TO OPTOMETRY  Stable. Encouraged pt to monitor BP at home, preferably in AM when first wakes up. Goal BP is < 130/90 if on meds. Discussed consequences of uncontrolled HTN and need for yearly eye exam. Recommended pt limit salt intake and engage in regular exercise. Continue current medications. F/U 3 mo or sooner if needed    Hyperlipidemia, unspecified hyperlipidemia type  -     METABOLIC PANEL, COMPREHENSIVE  -     LIPID PANEL  Discussed need for low fat diet, rich in fruits and vegetables. Encouraged regular meals and daily exercise of at least 20 minutes. Reviewed sequela of high cholesterol on heart and brain health. Continue current medications.  F/U 3 mo.    Skin disorder  -     clotrimazole-betamethasone (LOTRISONE) topical cream; Apply lotion to bilateral thighs BID x 1-2 weeks   New rx. F/U prn    Pt voiced understanding regarding plan of care. Follow-up Disposition:  Return if symptoms worsen or fail to improve. I have discussed the diagnosis with the patient and the intended plan as seen in the above orders. The patient has received an after-visit summary and questions were answered concerning future plans.      Medication Side Effects and Warnings were discussed with patient    Bhanu Burleson NP

## 2017-03-09 LAB
ALBUMIN SERPL-MCNC: 4 G/DL (ref 3.5–5.5)
ALBUMIN/GLOB SERPL: 1.3 {RATIO} (ref 1.1–2.5)
ALP SERPL-CCNC: 62 IU/L (ref 39–117)
ALT SERPL-CCNC: 12 IU/L (ref 0–44)
AST SERPL-CCNC: 23 IU/L (ref 0–40)
BILIRUB SERPL-MCNC: 0.2 MG/DL (ref 0–1.2)
BUN SERPL-MCNC: 13 MG/DL (ref 6–24)
BUN/CREAT SERPL: 14 (ref 9–20)
CALCIUM SERPL-MCNC: 9.7 MG/DL (ref 8.7–10.2)
CHLORIDE SERPL-SCNC: 100 MMOL/L (ref 96–106)
CHOLEST SERPL-MCNC: 122 MG/DL (ref 100–199)
CO2 SERPL-SCNC: 22 MMOL/L (ref 18–29)
CREAT SERPL-MCNC: 0.92 MG/DL (ref 0.76–1.27)
GLOBULIN SER CALC-MCNC: 3.2 G/DL (ref 1.5–4.5)
GLUCOSE SERPL-MCNC: 98 MG/DL (ref 65–99)
HDLC SERPL-MCNC: 50 MG/DL
INTERPRETATION, 910389: NORMAL
LDLC SERPL CALC-MCNC: 59 MG/DL (ref 0–99)
POTASSIUM SERPL-SCNC: 4.1 MMOL/L (ref 3.5–5.2)
PROT SERPL-MCNC: 7.2 G/DL (ref 6–8.5)
SODIUM SERPL-SCNC: 139 MMOL/L (ref 134–144)
TRIGL SERPL-MCNC: 64 MG/DL (ref 0–149)
VLDLC SERPL CALC-MCNC: 13 MG/DL (ref 5–40)

## 2017-03-09 NOTE — PROGRESS NOTES
Please inform caregivers labs look great, cholesterol well controlled - cont current meds. Recheck 6 -12 mo.    Thanks,  N

## 2017-06-08 ENCOUNTER — OFFICE VISIT (OUTPATIENT)
Dept: FAMILY MEDICINE CLINIC | Age: 52
End: 2017-06-08

## 2017-06-08 VITALS
RESPIRATION RATE: 16 BRPM | HEART RATE: 108 BPM | TEMPERATURE: 99.1 F | BODY MASS INDEX: 33.18 KG/M2 | SYSTOLIC BLOOD PRESSURE: 139 MMHG | DIASTOLIC BLOOD PRESSURE: 86 MMHG | OXYGEN SATURATION: 99 % | HEIGHT: 69 IN | WEIGHT: 224 LBS

## 2017-06-08 DIAGNOSIS — I10 ESSENTIAL HYPERTENSION: Primary | ICD-10-CM

## 2017-06-08 NOTE — PROGRESS NOTES
1. Have you been to the ER, urgent care clinic since your last visit? Hospitalized since your last visit? No    2. Have you seen or consulted any other health care providers outside of the [address] [place] [address] since your last visit? Include any pap smears or colon screening.  No     Chief Complaint   Patient presents with    Follow-up

## 2017-06-08 NOTE — MR AVS SNAPSHOT
Visit Information Date & Time Provider Department Dept. Phone Encounter #  
 6/8/2017  7:45 AM Kael Chery NP [address] [facility] [phone] [id number] Follow-up Instructions Return if symptoms worsen or fail to improve. Upcoming Health Maintenance Date Due INFLUENZA AGE 9 TO ADULT 8/1/2017 COLONOSCOPY 12/3/2025 DTaP/Tdap/Td series (2 - Td) 10/6/2026 Allergies as of 6/8/2017  Review Complete On: 6/8/2017 By: Marylu Phelps LPN No Known Allergies Current Immunizations  Reviewed on 10/27/2015 Name Date Influenza Vaccine (Quad) PF 10/6/2016 PPD 1/25/2012, 9/19/2011, 8/2/2010 TB Skin Test (PPD) Intradermal 10/27/2015, 9/9/2013 Not reviewed this visit You Were Diagnosed With   
  
 Codes Comments Essential hypertension    -  Primary ICD-10-CM: I10 
ICD-9-CM: 401.9 Vitals BP Pulse Temp Resp Height(growth percentile) Weight(growth percentile) 139/86 (!) 108 99.1 °F (37.3 °C) (Oral) 16 5' 9\" (1.753 m) 224 lb (101.6 kg) SpO2 BMI Smoking Status 99% 33.08 kg/m2 Former Smoker BMI and BSA Data Body Mass Index Body Surface Area 33.08 kg/m 2 2.22 m 2 Preferred Pharmacy Pharmacy Name Phone [facility]  [phone] Your Updated Medication List  
  
   
This list is accurate as of: 6/8/17  7:57 AM.  Always use your most recent med list. amLODIPine-benazepril 10-20 mg per capsule Commonly known as:  Karolee Sella Take 1 Cap by mouth daily. aspirin delayed-release 81 mg tablet TAKE ONE TABLET BY MOUTH DAILY. clobetasol 0.05 % lotion Commonly known as:  CLOBEX  
APPLY TO AFFECTED AREA 2 TIMES A DAY AS DIRECTED. clotrimazole-betamethasone topical cream  
Commonly known as:  Bobby Ort Apply lotion to bilateral thighs BID x 1-2 weeks DEPAKOTE  mg ER tablet Generic drug:  divalproex ER Take  by mouth.  
  
 escitalopram oxalate 20 mg tablet Commonly known as:  Tod Galarza Take 1 Tab by mouth daily. Gauze Bandage 4 X 4 \" Bndg  
1 Each by Apply Externally route two (2) times a day. hydrocortisone 2.5 % topical cream  
Commonly known as:  HYTONE Apply  to affected area two (2) times a day. use thin layer  
  
 ketoconazole 2 % shampoo Commonly known as:  NIZORAL Apply  to affected area daily as needed. * LORazepam 1 mg tablet Commonly known as:  ATIVAN Take 1 Tab by mouth three (3) times daily. * LORazepam 2 mg tablet Commonly known as:  ATIVAN Take 2 tablets by mouth nightly as needed for Anxiety. MAX per day is 10mg  
  
 mometasone 0.1 % topical cream  
Commonly known as:  Jacqualine Pancake Apply  to affected area daily. multivitamin with iron tablet Commonly known as:  TAB-A-SANTANA/IRON  
TAKE 1 TABLET BY MOUTH DAILY  
  
 mupirocin 2 % ointment Commonly known as:  [facility] Apply  to affected area daily as needed. * OLANZapine 15 mg tablet Commonly known as:  ZyPREXA Take 15 mg by mouth nightly. * OLANZapine 5 mg tablet Commonly known as:  ZyPREXA Take  by mouth nightly. omeprazole 20 mg capsule Commonly known as:  PRILOSEC  
TAKE ONE CAPSULE BY MOUTH DAILY  
  
 OTHER Attends extra large pull-up, use as needed. Dx: 318.0  
  
 * polyethylene glycol 17 gram packet Commonly known as:  Bonnita Amass Take 1 Packet by mouth daily. * polyethylene glycol 17 gram/dose powder Commonly known as:  Bonnita Amass MIX 17 GRAMS WITH JUICE/WATER AND DRINK THREE TIMES A WEEK. QUEtiapine 400 mg tablet Commonly known as:  SEROquel  
  
 risperiDONE 4 mg tablet Commonly known as:  RisperDAL Take  by mouth.  
  
 sucralfate 1 gram tablet Commonly known as:  CARAFATE  
TAKE (1) TABLET BY MOUTH THREE TIMES DAILY. Therapy M 9 mg iron-400 mcg Tab tablet Generic drug:  multivitamin, tx-iron-ca-min TAKE ONE TABLET BY MOUTH DAILY. * Notice: This list has 6 medication(s) that are the same as other medications prescribed for you. Read the directions carefully, and ask your doctor or other care provider to review them with you. Follow-up Instructions Return if symptoms worsen or fail to improve. Patient Instructions High Blood Pressure: Care Instructions Your Care Instructions If your blood pressure is usually above 140/90, you have high blood pressure, or hypertension. That means the top number is 140 or higher or the bottom number is 90 or higher, or both. Despite what a lot of people think, high blood pressure usually doesn't cause headaches or make you feel dizzy or lightheaded. It usually has no symptoms. But it does increase your risk for heart attack, stroke, and kidney or eye damage. The higher your blood pressure, the more your risk increases. Your doctor will give you a goal for your blood pressure. Your goal will be based on your health and your age. An example of a goal is to keep your blood pressure below 140/90. Lifestyle changes, such as eating healthy and being active, are always important to help lower blood pressure. You might also take medicine to reach your blood pressure goal. 
Follow-up care is a key part of your treatment and safety. Be sure to make and go to all appointments, and call your doctor if you are having problems. It's also a good idea to know your test results and keep a list of the medicines you take. How can you care for yourself at home? Medical treatment · If you stop taking your medicine, your blood pressure will go back up. You may take one or more types of medicine to lower your blood pressure. Be safe with medicines. Take your medicine exactly as prescribed. Call your doctor if you think you are having a problem with your medicine. · Talk to your doctor before you start taking aspirin every day. Aspirin can help certain people lower their risk of a heart attack or stroke. But taking aspirin isn't right for everyone, because it can cause serious bleeding. · See your doctor regularly. You may need to see the doctor more often at first or until your blood pressure comes down. · If you are taking blood pressure medicine, talk to your doctor before you take decongestants or anti-inflammatory medicine, such as ibuprofen. Some of these medicines can raise blood pressure. · Learn how to check your blood pressure at home. Lifestyle changes · Stay at a healthy weight. This is especially important if you put on weight around the waist. Losing even 10 pounds can help you lower your blood pressure. · If your doctor recommends it, get more exercise. Walking is a good choice. Bit by bit, increase the amount you walk every day. Try for at least 30 minutes on most days of the week. You also may want to swim, bike, or do other activities. · Avoid or limit alcohol. Talk to your doctor about whether you can drink any alcohol. · Try to limit how much sodium you eat to less than 2,300 milligrams (mg) a day. Your doctor may ask you to try to eat less than 1,500 mg a day. · Eat plenty of fruits (such as bananas and oranges), vegetables, legumes, whole grains, and low-fat dairy products. · Lower the amount of saturated fat in your diet. Saturated fat is found in animal products such as milk, cheese, and meat. Limiting these foods may help you lose weight and also lower your risk for heart disease. · Do not smoke. Smoking increases your risk for heart attack and stroke. If you need help quitting, talk to your doctor about stop-smoking programs and medicines. These can increase your chances of quitting for good. When should you call for help? Call 911 anytime you think you may need emergency care.  This may mean having symptoms that suggest that your blood pressure is causing a serious heart or blood vessel problem. Your blood pressure may be over 180/110. For example, call 911 if: 
· You have symptoms of a heart attack. These may include: ¨ Chest pain or pressure, or a strange feeling in the chest. 
¨ Sweating. ¨ Shortness of breath. ¨ Nausea or vomiting. ¨ Pain, pressure, or a strange feeling in the back, neck, jaw, or upper belly or in one or both shoulders or arms. ¨ Lightheadedness or sudden weakness. ¨ A fast or irregular heartbeat. · You have symptoms of a stroke. These may include: 
¨ Sudden numbness, tingling, weakness, or loss of movement in your face, arm, or leg, especially on only one side of your body. ¨ Sudden vision changes. ¨ Sudden trouble speaking. ¨ Sudden confusion or trouble understanding simple statements. ¨ Sudden problems with walking or balance. ¨ A sudden, severe headache that is different from past headaches. · You have severe back or belly pain. Do not wait until your blood pressure comes down on its own. Get help right away. Call your doctor now or seek immediate care if: 
· Your blood pressure is much higher than normal (such as 180/110 or higher), but you don't have symptoms. · You think high blood pressure is causing symptoms, such as: ¨ Severe headache. ¨ Blurry vision. Watch closely for changes in your health, and be sure to contact your doctor if: 
· Your blood pressure measures 140/90 or higher at least 2 times. That means the top number is 140 or higher or the bottom number is 90 or higher, or both. · You think you may be having side effects from your blood pressure medicine. · Your blood pressure is usually normal, but it goes above normal at least 2 times. Where can you learn more? Go to http://jaren-chucho.info/. Enter D658 in the search box to learn more about \"High Blood Pressure: Care Instructions. \" Current as of: August 8, 2016 Content Version: 11.2 © 1662-1736 [employer], [employer]. Care instructions adapted under license by [employer] (which disclaims liability or warranty for this information). If you have questions about a medical condition or this instruction, always ask your healthcare professional. [address] any warranty or liability for your use of this information. Introducing [facility]! Dear Adelia Schultz: Thank you for requesting a [employer] account. Our records indicate that you have previously registered for a [employer] account but its currently inactive. Please call our [employer] support line at [phone]. Additional Information If you have questions, please visit the Frequently Asked Questions section of the [employer] website at [email or web address] Remember, [employer] is NOT to be used for urgent needs. For medical emergencies, dial 911. Now available from your iPhone and Android! Please provide this summary of care documentation to your next provider. Your primary care clinician is listed as ALEXIS KIDD. If you have any questions after today's visit, please call [phone].

## 2017-06-08 NOTE — PROGRESS NOTES
Chief Complaint   Patient presents with    Follow-up     he is a 48y.o. year old male who presents for evalution. Here for HTN F/U. Takes medication daily. No chest pain, SOB, dizziness, vision changes. Checks BP at home. Last eye exam was 2 months ago. Reviewed PmHx, RxHx, FmHx, SocHx, AllgHx and updated and dated in the chart. Review of Systems - negative except as listed above in the HPI    Objective:     Vitals:    06/08/17 0751   BP: 139/86   Pulse: (!) 108   Resp: 16   Temp: 99.1 °F (37.3 °C)   TempSrc: Oral   SpO2: 99%   Weight: 224 lb (101.6 kg)   Height: 5' 9\" (1.753 m)     Physical Examination: General appearance - alert, well appearing, and in no distress  Chest - clear to auscultation, no wheezes, rales or rhonchi, symmetric air entry  Heart - normal rate, regular rhythm, normal S1, S2, no murmurs, rubs, clicks or gallops    Assessment/ Plan:   Sravanthi Zamarripa was seen today for follow-up. Diagnoses and all orders for this visit:    Essential hypertension  Stable. Encouraged pt to monitor BP at home, preferably in AM when first wakes up. Goal BP is < 130/90 if on meds. Discussed consequences of uncontrolled HTN and need for yearly eye exam. Recommended pt limit salt intake and engage in regular exercise. Continue current medications. F/U 6 mo or sooner if needed     Pt voiced understanding regarding plan of care. Follow-up Disposition:  Return if symptoms worsen or fail to improve. I have discussed the diagnosis with the patient and the intended plan as seen in the above orders. The patient has received an after-visit summary and questions were answered concerning future plans.      Medication Side Effects and Warnings were discussed with patient    Freddy Gloria NP

## 2017-06-08 NOTE — PATIENT INSTRUCTIONS

## 2017-07-04 RX ORDER — OMEPRAZOLE 20 MG/1
CAPSULE, DELAYED RELEASE ORAL
Qty: 30 CAP | Refills: 5 | Status: SHIPPED | OUTPATIENT
Start: 2017-07-04 | End: 2018-01-04 | Stop reason: SDUPTHER

## 2017-07-06 ENCOUNTER — TELEPHONE (OUTPATIENT)
Dept: FAMILY MEDICINE CLINIC | Age: 52
End: 2017-07-06

## 2017-07-06 RX ORDER — MECLIZINE HYDROCHLORIDE 25 MG/1
25 TABLET ORAL
Qty: 10 TAB | Refills: 0 | Status: SHIPPED | OUTPATIENT
Start: 2017-07-06 | End: 2017-12-18 | Stop reason: SDUPTHER

## 2017-08-20 RX ORDER — CLOBETASOL PROPIONATE 0.5 MG/ML
LOTION TOPICAL
Qty: 59 ML | Refills: 0 | Status: SHIPPED | OUTPATIENT
Start: 2017-08-20 | End: 2017-10-24 | Stop reason: SDUPTHER

## 2017-08-20 RX ORDER — POLYETHYLENE GLYCOL 3350 17 G/17G
POWDER, FOR SOLUTION ORAL
Qty: 527 G | Refills: 0 | Status: SHIPPED | OUTPATIENT
Start: 2017-08-20 | End: 2017-10-24 | Stop reason: SDUPTHER

## 2017-10-17 RX ORDER — MULTIPLE VITAMINS W/ MINERALS TAB 9MG-400MCG
TAB ORAL
Qty: 30 TAB | Refills: 0 | Status: SHIPPED | OUTPATIENT
Start: 2017-10-17 | End: 2018-06-08 | Stop reason: SDUPTHER

## 2017-10-18 ENCOUNTER — HOSPITAL ENCOUNTER (OUTPATIENT)
Dept: LAB | Age: 52
Discharge: HOME | End: 2017-10-18
Payer: MEDICARE

## 2017-10-18 ENCOUNTER — OFFICE VISIT (OUTPATIENT)
Dept: FAMILY MEDICINE CLINIC | Age: 52
End: 2017-10-18

## 2017-10-18 VITALS
WEIGHT: 228 LBS | TEMPERATURE: 97.9 F | HEART RATE: 83 BPM | SYSTOLIC BLOOD PRESSURE: 113 MMHG | BODY MASS INDEX: 33.77 KG/M2 | HEIGHT: 69 IN | RESPIRATION RATE: 16 BRPM | DIASTOLIC BLOOD PRESSURE: 73 MMHG | OXYGEN SATURATION: 99 %

## 2017-10-18 DIAGNOSIS — F41.9 ANXIETY: ICD-10-CM

## 2017-10-18 DIAGNOSIS — D64.9 ANEMIA, UNSPECIFIED TYPE: ICD-10-CM

## 2017-10-18 DIAGNOSIS — F79 MR (MENTAL RETARDATION): ICD-10-CM

## 2017-10-18 DIAGNOSIS — Z11.1 SCREENING FOR TUBERCULOSIS: ICD-10-CM

## 2017-10-18 DIAGNOSIS — Z00.00 ROUTINE GENERAL MEDICAL EXAMINATION AT A HEALTH CARE FACILITY: Primary | ICD-10-CM

## 2017-10-18 DIAGNOSIS — Z71.89 ADVANCE CARE PLANNING: ICD-10-CM

## 2017-10-18 DIAGNOSIS — Z23 ENCOUNTER FOR IMMUNIZATION: ICD-10-CM

## 2017-10-18 DIAGNOSIS — Z12.5 SCREENING FOR PROSTATE CANCER: ICD-10-CM

## 2017-10-18 PROCEDURE — 86480 TB TEST CELL IMMUN MEASURE: CPT

## 2017-10-18 PROCEDURE — 80053 COMPREHEN METABOLIC PANEL: CPT

## 2017-10-18 PROCEDURE — 85025 COMPLETE CBC W/AUTO DIFF WBC: CPT

## 2017-10-18 PROCEDURE — 84443 ASSAY THYROID STIM HORMONE: CPT

## 2017-10-18 PROCEDURE — 84153 ASSAY OF PSA TOTAL: CPT

## 2017-10-18 NOTE — LETTER
10/23/2017 11:31 AM 
 
Mr. Joshua Guallpa [address] [address] [address] [address] Dear Joshua Guallpa: 
 
Please find your most recent results below. Resulted Orders CBC WITH AUTOMATED DIFF Result Value Ref Range WBC 8.8 3.4 - 10.8 x10E3/uL  
 RBC 3.71 (L) 4.14 - 5.80 x10E6/uL HGB 11.4 (L) 12.6 - 17.7 g/dL HCT 34.4 (L) 37.5 - 51.0 % MCV 93 79 - 97 fL  
 MCH 30.7 26.6 - 33.0 pg  
 MCHC 33.1 31.5 - 35.7 g/dL  
 RDW 15.0 12.3 - 15.4 % PLATELET 545 404 - 870 x10E3/uL NEUTROPHILS 52 Not Estab. % Lymphocytes 36 Not Estab. % MONOCYTES 9 Not Estab. % EOSINOPHILS 3 Not Estab. % BASOPHILS 0 Not Estab. %  
 ABS. NEUTROPHILS 4.5 1.4 - 7.0 x10E3/uL Abs Lymphocytes 3.2 (H) 0.7 - 3.1 x10E3/uL  
 ABS. MONOCYTES 0.8 0.1 - 0.9 x10E3/uL  
 ABS. EOSINOPHILS 0.2 0.0 - 0.4 x10E3/uL  
 ABS. BASOPHILS 0.0 0.0 - 0.2 x10E3/uL IMMATURE GRANULOCYTES 0 Not Estab. %  
 ABS. IMM. GRANS. 0.0 0.0 - 0.1 x10E3/uL Narrative Performed at:  [address]  [id number] : Silvia Woo MD, Phone:  [phone] PSA SCREENING (SCREENING) Result Value Ref Range Prostate Specific Ag 0.6 0.0 - 4.0 ng/mL Comment:  
   Roche ECLIA methodology. According to the American Urological Association, Serum PSA should 
decrease and remain at undetectable levels after radical 
prostatectomy. The AUA defines biochemical recurrence as an initial 
PSA value 0.2 ng/mL or greater followed by a subsequent confirmatory PSA value 0.2 ng/mL or greater. Values obtained with different assay methods or kits cannot be used 
interchangeably. Results cannot be interpreted as absolute evidence 
of the presence or absence of malignant disease. Narrative Performed at:  [address]  [id number] : Silvia Woo MD, Phone:  [phone] METABOLIC PANEL, COMPREHENSIVE  
 Result Value Ref Range Glucose 116 (H) 65 - 99 mg/dL BUN 13 6 - 24 mg/dL Creatinine 0.90 0.76 - 1.27 mg/dL GFR est non-AA 97 >59 mL/min/1.73 GFR est  >59 mL/min/1.73  
 BUN/Creatinine ratio 14 9 - 20 Sodium 143 134 - 144 mmol/L Potassium 4.1 3.5 - 5.2 mmol/L Chloride 105 96 - 106 mmol/L  
 CO2 25 18 - 29 mmol/L Calcium 9.6 8.7 - 10.2 mg/dL Protein, total 7.1 6.0 - 8.5 g/dL Albumin 4.0 3.5 - 5.5 g/dL GLOBULIN, TOTAL 3.1 1.5 - 4.5 g/dL A-G Ratio 1.3 1.2 - 2.2 Bilirubin, total <0.2 0.0 - 1.2 mg/dL Alk. phosphatase 58 39 - 117 IU/L  
 AST (SGOT) 19 0 - 40 IU/L  
 ALT (SGPT) 21 0 - 44 IU/L Narrative Performed at:  [address]  [id number] : Malena Lynn MD, Phone:  [phone] TSH 3RD GENERATION Result Value Ref Range TSH 1.540 0.450 - 4.500 uIU/mL Narrative Performed at:  [address]  [id number] : Malena Lynn MD, Phone:  [phone] QUANTIFERON TB GOLD Result Value Ref Range QuantiFERON Incubation Incubated, specimen forwarded to [place], [place] for 
completion of the assay. Narrative Performed at:  [address]  [id number] : Malena Lynn MD, Phone:  [phone] QUANTIFERON IN TUBE REFL Result Value Ref Range QuantiFERON TB Gold Negative Negative QUANTIFERON CRITERIA Comment Comment: To be considered positive a specimen should have a TB Ag minus Nil 
value greater than or equal to 0.35 IU/mL and in addition the TB Ag 
minus Nil value must be greater than or equal to 25% of the Nil 
value. There may be insufficient information in these values to 
differentiate between some negative and some indeterminate test 
values. QuantiFERON TB Ag Value 0.03 IU/mL QuantiFERON Nil Value 0.03 IU/mL QuantiFERON Mitogen Value 8.19 IU/mL  
 QFT TB Ag minus Nil Value 0.00 IU/mL Interpretation: Comment Comment:  
   The QuantiFERON TB Gold (in Tube) assay is intended for use as an aid 
in the diagnosis of TB infection. Negative results suggest that there 
is no TB infection. In patients with high suspicion of exposure, a 
negative test should be repeated. A positive test indicates infection 
with Mycobacterium tuberculosis. Among individuals without 
tuberculosis infection, a positive test may be due to exposure to 
[place], M. szreneagai or M. marinum. On the Internet, go to 
cdc.gov/tb for further details. Narrative Performed at:  [address]  [id number] : Silvia Woo MD, Phone:  [phone] RECOMMENDATIONS: 
Please inform caregivers TB test negative. Please call me if you have any questions: [phone] Sincerely, Joseph Aparicio NP

## 2017-10-18 NOTE — MR AVS SNAPSHOT
Visit Information Date & Time Provider Department Dept. Phone Encounter #  
 10/18/2017  3:30 PM Joe Palomo NP [facility] [phone] [id number] Follow-up Instructions Return if symptoms worsen or fail to improve. Your Appointments 10/18/2017  3:30 PM  
PHYSICAL PRE OP with Joe Palomo NP [facility] ([address]) Appt Note: cpe tb test  
 [address] [address] [phone]  
  
   
 [address] [address] Upcoming Health Maintenance Date Due INFLUENZA AGE 9 TO ADULT 8/1/2017 COLONOSCOPY 12/3/2025 DTaP/Tdap/Td series (2 - Td) 10/6/2026 Allergies as of 10/18/2017  Review Complete On: 10/18/2017 By: Zaria Pérez LPN No Known Allergies Current Immunizations  Reviewed on 10/27/2015 Name Date Influenza Vaccine Kelleen Joan) 10/18/2017 Influenza Vaccine (Quad) PF 10/6/2016 PPD 1/25/2012, 9/19/2011, 8/2/2010 TB Skin Test (PPD) Intradermal 10/27/2015, 9/9/2013 Not reviewed this visit You Were Diagnosed With   
  
 Codes Comments Routine general medical examination at a health care facility    -  Primary ICD-10-CM: Z00.00 ICD-9-CM: V70.0 Anemia, unspecified type     ICD-10-CM: D64.9 ICD-9-CM: 285.9 MR (mental retardation)     ICD-10-CM: O15 
ICD-9-CM: 578 Anxiety     ICD-10-CM: F41.9 ICD-9-CM: 300.00 Screening for prostate cancer     ICD-10-CM: Z12.5 ICD-9-CM: V76.44 Screening for tuberculosis     ICD-10-CM: Z11.1 ICD-9-CM: V74.1 Encounter for immunization     ICD-10-CM: M48 ICD-9-CM: V03.89 Vitals BP Pulse Temp Resp Height(growth percentile) Weight(growth percentile) 113/73 83 97.9 °F (36.6 °C) (Oral) 16 5' 9\" (1.753 m) 228 lb (103.4 kg) SpO2 BMI Smoking Status 99% 33.67 kg/m2 Former Smoker Vitals History BMI and BSA Data Body Mass Index Body Surface Area 33.67 kg/m 2 2.24 m 2 Preferred Pharmacy Pharmacy Name Phone [facility] [phone] Your Updated Medication List  
  
   
This list is accurate as of: 10/18/17  2:46 PM.  Always use your most recent med list. amLODIPine-benazepril 10-20 mg per capsule Commonly known as:  Quin Jed Take 1 Cap by mouth daily. aspirin delayed-release 81 mg tablet TAKE ONE TABLET BY MOUTH DAILY. clobetasol 0.05 % lotion Commonly known as:  CLOBEX  
APPLY TO AFFECTED AREA 2 TIMES A DAY AS DIRECTED. clotrimazole-betamethasone topical cream  
Commonly known as:  Delpha Stains Apply lotion to bilateral thighs BID x 1-2 weeks DEPAKOTE  mg ER tablet Generic drug:  divalproex ER Take  by mouth.  
  
 escitalopram oxalate 20 mg tablet Commonly known as:  Saskia John Take 1 Tab by mouth daily. Gauze Bandage 4 X 4 \" Bndg  
1 Each by Apply Externally route two (2) times a day. hydrocortisone 2.5 % topical cream  
Commonly known as:  HYTONE Apply  to affected area two (2) times daily as needed. use thin layer  
  
 ketoconazole 2 % shampoo Commonly known as:  NIZORAL Apply  to affected area daily as needed. * LORazepam 1 mg tablet Commonly known as:  ATIVAN Take 1 Tab by mouth three (3) times daily. * LORazepam 2 mg tablet Commonly known as:  ATIVAN Take 2 tablets by mouth nightly as needed for Anxiety. MAX per day is 10mg  
  
 meclizine 25 mg tablet Commonly known as:  DRAMAMINE LESS DROWSY Take 1 Tab by mouth three (3) times daily as needed. For nausea  
  
 mometasone 0.1 % topical cream  
Commonly known as:  Jarrett Keep Apply  to affected area daily. multivitamin with iron tablet Commonly known as:  TAB-A-SANTANA/IRON  
TAKE 1 TABLET BY MOUTH DAILY  
  
 mupirocin 2 % ointment Commonly known as:  [facility] [facility] Apply  to affected area daily as needed. * OLANZapine 15 mg tablet Commonly known as:  ZyPREXA Take 15 mg by mouth nightly. * OLANZapine 5 mg tablet Commonly known as:  ZyPREXA Take  by mouth nightly. omeprazole 20 mg capsule Commonly known as:  PRILOSEC  
TAKE ONE CAPSULE BY MOUTH DAILY  
  
 OTHER Attends extra large pull-up, use as needed. Dx: 318.0  
  
 * polyethylene glycol 17 gram packet Commonly known as:  Henrey Lulu Take 1 Packet by mouth daily. * polyethylene glycol 17 gram/dose powder Commonly known as:  Henrey Lulu MIX 17 GRAMS WITH JUICE/WATER AND DRINK THREE TIMES A WEEK. QUEtiapine 400 mg tablet Commonly known as:  SEROquel  
  
 risperiDONE 4 mg tablet Commonly known as:  RisperDAL Take  by mouth.  
  
 sucralfate 1 gram tablet Commonly known as:  CARAFATE  
TAKE (1) TABLET BY MOUTH THREE TIMES DAILY Therapy M 9 mg iron-400 mcg Tab tablet Generic drug:  multivitamin, tx-iron-ca-min TAKE ONE TABLET BY MOUTH DAILY. * Notice: This list has 6 medication(s) that are the same as other medications prescribed for you. Read the directions carefully, and ask your doctor or other care provider to review them with you. We Performed the Following CBC WITH AUTOMATED DIFF [30640 CPT(R)] INFLUENZA VACCINE QUADRIVALENT VIAL, SPLIT, 3 YRS PLUS IM [id number] CPT(R)] METABOLIC PANEL, COMPREHENSIVE [43897 CPT(R)] GA IMMUNIZ ADMIN,1 SINGLE/COMB VAC/TOXOID [id number] CPT(R)] PSA SCREENING (SCREENING) [ [facility]] QUANTIFERON TB GOLD [[id number] Custom] TSH 3RD GENERATION [19945 CPT(R)] Follow-up Instructions Return if symptoms worsen or fail to improve. Patient Instructions Well Visit, Men 48 to 72: Care Instructions Your Care Instructions Physical exams can help you stay healthy. Your doctor has checked your overall health and may have suggested ways to take good care of yourself. He or she also may have recommended tests. At home, you can help prevent illness with healthy eating, regular exercise, and other steps. Follow-up care is a key part of your treatment and safety. Be sure to make and go to all appointments, and call your doctor if you are having problems. It's also a good idea to know your test results and keep a list of the medicines you take. How can you care for yourself at home? · Reach and stay at a healthy weight. This will lower your risk for many problems, such as obesity, diabetes, heart disease, and high blood pressure. · Get at least 30 minutes of exercise on most days of the week. Walking is a good choice. You also may want to do other activities, such as running, swimming, cycling, or playing tennis or team sports. · Do not smoke. Smoking can make health problems worse. If you need help quitting, talk to your doctor about stop-smoking programs and medicines. These can increase your chances of quitting for good. · Protect your skin from too much sun. When you're outdoors from 10 a.m. to 4 p.m., stay in the shade or cover up with clothing and a hat with a wide brim. Wear sunglasses that block UV rays. Even when it's cloudy, put broad-spectrum sunscreen (SPF 30 or higher) on any exposed skin. · See a dentist one or two times a year for checkups and to have your teeth cleaned. · Wear a seat belt in the car. · Limit alcohol to 2 drinks a day. Too much alcohol can cause health problems. Follow your doctor's advice about when to have certain tests. These tests can spot problems early. · Cholesterol. Your doctor will tell you how often to have this done based on your overall health and other things that can increase your risk for heart attack and stroke. · Blood pressure. Have your blood pressure checked during a routine doctor visit. Your doctor will tell you how often to check your blood pressure based on your age, your blood pressure results, and other factors. · Prostate exam. Talk to your doctor about whether you should have a blood test (called a PSA test) for prostate cancer. Experts disagree on whether men should have this test. Some experts recommend that you discuss the benefits and risks of the test with your doctor. · Diabetes. Ask your doctor whether you should have tests for diabetes. · Vision. Some experts recommend that you have yearly exams for glaucoma and other age-related eye problems starting at age 48. · Hearing. Tell your doctor if you notice any change in your hearing. You can have tests to find out how well you hear. · Colon cancer. You should begin tests for colon cancer at age 48. You may have one of several tests. Your doctor will tell you how often to have tests based on your age and risk. Risks include whether you already had a precancerous polyp removed from your colon or whether your parent, brother, sister, or child has had colon cancer. · Heart attack and stroke risk. At least every 4 to 6 years, you should have your risk for heart attack and stroke assessed. Your doctor uses factors such as your age, blood pressure, cholesterol, and whether you smoke or have diabetes to show what your risk for a heart attack or stroke is over the next 10 years. · Abdominal aortic aneurysm. Ask your doctor whether you should have a test to check for an aneurysm. You may need a test if you ever smoked or if your parent, brother, sister, or child has had an aneurysm. When should you call for help? Watch closely for changes in your health, and be sure to contact your doctor if you have any problems or symptoms that concern you. Where can you learn more? Go to http://jaren-chucho.info/. Enter S956 in the search box to learn more about \"Well Visit, Men 48 to 72: Care Instructions. \" Current as of: July 19, 2016 Content Version: 11.3 © 0654-6884 [employer].  Care instructions adapted under license by [address] (which disclaims liability or warranty for this information). If you have questions about a medical condition or this instruction, always ask your healthcare professional. [address] any warranty or liability for your use of this information. Introducing [facility]! Dear Adalberto Edmond: Thank you for requesting a [employer] account. Our records indicate that you have previously registered for a [employer] account but its currently inactive. Please call our [employer] support line at [phone]. Additional Information If you have questions, please visit the Frequently Asked Questions section of the [employer] website at [email or web address] Remember, [employer] is NOT to be used for urgent needs. For medical emergencies, dial 911. Now available from your iPhone and Android! Please provide this summary of care documentation to your next provider. Your primary care clinician is listed as ALEXIS KIDD. If you have any questions after today's visit, please call [phone].

## 2017-10-18 NOTE — PATIENT INSTRUCTIONS

## 2017-10-18 NOTE — PROGRESS NOTES
This is a Subsequent Medicare Annual Wellness Exam (AWV) (Performed 12 months after IPPE or effective date of Medicare Part B enrollment)    I have reviewed the patient's medical history in detail and updated the computerized patient record. History     Past Medical History:   Diagnosis Date    Anxiety     Depression     MR (mental retardation)       History reviewed. No pertinent surgical history. Current Outpatient Prescriptions   Medication Sig Dispense Refill    THERAPY M 9 mg iron-400 mcg tab tablet TAKE ONE TABLET BY MOUTH DAILY. 30 Tab 0    sucralfate (CARAFATE) 1 gram tablet TAKE (1) TABLET BY MOUTH THREE TIMES DAILY 90 Tab 5    hydrocortisone (HYTONE) 2.5 % topical cream Apply  to affected area two (2) times daily as needed. use thin layer 30 g 1    polyethylene glycol (MIRALAX) 17 gram/dose powder MIX 17 GRAMS WITH JUICE/WATER AND DRINK THREE TIMES A WEEK. 527 g 0    clobetasol (CLOBEX) 0.05 % lotion APPLY TO AFFECTED AREA 2 TIMES A DAY AS DIRECTED. 59 mL 0    meclizine (DRAMAMINE LESS DROWSY) 25 mg tablet Take 1 Tab by mouth three (3) times daily as needed. For nausea 10 Tab 0    omeprazole (PRILOSEC) 20 mg capsule TAKE ONE CAPSULE BY MOUTH DAILY 30 Cap 5    aspirin delayed-release 81 mg tablet TAKE ONE TABLET BY MOUTH DAILY. 30 Tab 5    amLODIPine-benazepril (LOTREL) 10-20 mg per capsule Take 1 Cap by mouth daily. 30 Cap 5    clotrimazole-betamethasone (LOTRISONE) topical cream Apply lotion to bilateral thighs BID x 1-2 weeks 30 g 0    OLANZapine (ZYPREXA) 5 mg tablet Take  by mouth nightly.  mupirocin (BACTROBAN) 2 % ointment Apply  to affected area daily as needed. 22 g 2    polyethylene glycol (MIRALAX) 17 gram packet Take 1 Packet by mouth daily. 30 Each 11    risperiDONE (RISPERDAL) 4 mg tablet Take  by mouth.  divalproex ER (DEPAKOTE ER) 500 mg ER tablet Take  by mouth.       multivitamin with iron (TAB-A-SANTANA/IRON) tablet TAKE 1 TABLET BY MOUTH DAILY 31 Tab PRN    QUEtiapine (SEROQUEL) 400 mg tablet       OTHER Attends extra large pull-up, use as needed. Dx: 318.0 150 Each 11    LORazepam (ATIVAN) 2 mg tablet Take 2 tablets by mouth nightly as needed for Anxiety. MAX per day is 10mg 30 tablet 0    OLANZapine (ZYPREXA) 15 mg tablet Take 15 mg by mouth nightly.  Gauze Bandage 4 X 4 \" Bndg 1 Each by Apply Externally route two (2) times a day. 120 Each 5    ketoconazole (NIZORAL) 2 % shampoo Apply  to affected area daily as needed.  LORazepam (ATIVAN) 1 mg tablet Take 1 Tab by mouth three (3) times daily. (Patient taking differently: Take  by mouth two (2) times a day.) 90 Tab 0    escitalopram (LEXAPRO) 20 mg tablet Take 1 Tab by mouth daily. 30 Tab 5    mometasone (ELOCON) 0.1 % topical cream Apply  to affected area daily. No Known Allergies  History reviewed. No pertinent family history. Social History   Substance Use Topics    Smoking status: Former Smoker     Packs/day: 1.00     Years: 30.00     Types: Cigarettes    Smokeless tobacco: Never Used    Alcohol use No     Patient Active Problem List   Diagnosis Code    Anxiety state, unspecified F41.1    MR (mental retardation) F79    Constipation K59.00    Hyperlipidemia E78.5    Essential hypertension I10     Depression Risk Factor Screening:     PHQ over the last two weeks 10/18/2017   PHQ Not Done -   Little interest or pleasure in doing things Not at all   Feeling down, depressed or hopeless Not at all   Total Score PHQ 2 0     Alcohol Risk Factor Screening: You do not drink alcohol or very rarely. Functional Ability and Level of Safety:   Hearing Loss  Hearing is good. Activities of Daily Living  The home contains: handrails and grab bars  Patient needs help with:  phone, transportation, shopping, preparing meals, laundry, housework, managing medications, managing money, eating, dressing, bathing, hygiene and bathroom needs    Fall RiskNo flowsheet data found.     Abuse Screen  Patient is not abused    Cognitive Screening   Evaluation of Cognitive Function:  Has your family/caregiver stated any concerns about your memory: no  Abnormal - unchanged from prior exams     Physical Examination: General appearance - alert, well appearing, and in no distress  Ears - bilateral TM's and external ear canals normal  Nose - normal and patent, no erythema, discharge or polyps  Mouth - mucous membranes moist, pharynx normal without lesions  Neck - supple, no significant adenopathy  Chest - clear to auscultation, no wheezes, rales or rhonchi, symmetric air entry  Heart - normal rate, regular rhythm, normal S1, S2, no murmurs, rubs, clicks or gallops  Abdomen - soft, nontender, nondistended, no masses or organomegaly  Back exam - full range of motion, no tenderness, palpable spasm or pain on motion  Neurological - abnormal neurological exam unchanged from prior examinations  Musculoskeletal - no joint tenderness, deformity or swelling  Extremities - peripheral pulses normal, no pedal edema, no clubbing or cyanosis    Patient Care Team   Patient Care Team:  Aimee Trammell NP as PCP - General (Nurse Practitioner)    Assessment/Plan   Education and counseling provided:  Are appropriate based on today's review and evaluation  End-of-Life planning (with patient's consent)  Influenza Vaccine  Prostate cancer screening tests (PSA, covered annually)  Colorectal cancer screening tests  Cardiovascular screening blood test    Diagnoses and all orders for this visit:    1. Routine general medical examination at a health care facility    2. Anemia, unspecified type  -     CBC WITH AUTOMATED DIFF    3. MR (mental retardation)  -     METABOLIC PANEL, COMPREHENSIVE  -     TSH 3RD GENERATION    4. Anxiety  -     METABOLIC PANEL, COMPREHENSIVE  -     TSH 3RD GENERATION    5. Screening for prostate cancer  -     PSA SCREENING (SCREENING)    6. Screening for tuberculosis  -     QUANTIFERON TB GOLD    7.  Encounter for immunization  -     Influenza vaccine (QUADRIVALENT VIAL)  IM (07959)  -     [place] IMMUNIZ ADMIN,1 SINGLE/COMB VAC/TOXOID    8. Advance care planning  -     ADVANCE CARE PLANNING FIRST 30 MINS      Will decide on F/U after reviewing labs.    Abdelrahman Georges NP    Health Maintenance Due   Topic Date Due    INFLUENZA AGE 9 TO ADULT  08/01/2017

## 2017-10-19 DIAGNOSIS — Z12.11 SCREENING FOR COLON CANCER: ICD-10-CM

## 2017-10-19 DIAGNOSIS — D64.9 ANEMIA, UNSPECIFIED TYPE: Primary | ICD-10-CM

## 2017-10-19 LAB
ALBUMIN SERPL-MCNC: 4 G/DL (ref 3.5–5.5)
ALBUMIN/GLOB SERPL: 1.3 {RATIO} (ref 1.2–2.2)
ALP SERPL-CCNC: 58 IU/L (ref 39–117)
ALT SERPL-CCNC: 21 IU/L (ref 0–44)
AST SERPL-CCNC: 19 IU/L (ref 0–40)
BASOPHILS # BLD AUTO: 0 X10E3/UL (ref 0–0.2)
BASOPHILS NFR BLD AUTO: 0 %
BILIRUB SERPL-MCNC: <0.2 MG/DL (ref 0–1.2)
BUN SERPL-MCNC: 13 MG/DL (ref 6–24)
BUN/CREAT SERPL: 14 (ref 9–20)
CALCIUM SERPL-MCNC: 9.6 MG/DL (ref 8.7–10.2)
CHLORIDE SERPL-SCNC: 105 MMOL/L (ref 96–106)
CO2 SERPL-SCNC: 25 MMOL/L (ref 18–29)
CREAT SERPL-MCNC: 0.9 MG/DL (ref 0.76–1.27)
EOSINOPHIL # BLD AUTO: 0.2 X10E3/UL (ref 0–0.4)
EOSINOPHIL NFR BLD AUTO: 3 %
ERYTHROCYTE [DISTWIDTH] IN BLOOD BY AUTOMATED COUNT: 15 % (ref 12.3–15.4)
GFR SERPLBLD CREATININE-BSD FMLA CKD-EPI: 112 ML/MIN/1.73
GFR SERPLBLD CREATININE-BSD FMLA CKD-EPI: 97 ML/MIN/1.73
GLOBULIN SER CALC-MCNC: 3.1 G/DL (ref 1.5–4.5)
GLUCOSE SERPL-MCNC: 116 MG/DL (ref 65–99)
HCT VFR BLD AUTO: 34.4 % (ref 37.5–51)
HGB BLD-MCNC: 11.4 G/DL (ref 12.6–17.7)
IMM GRANULOCYTES # BLD: 0 X10E3/UL (ref 0–0.1)
IMM GRANULOCYTES NFR BLD: 0 %
LYMPHOCYTES # BLD AUTO: 3.2 X10E3/UL (ref 0.7–3.1)
LYMPHOCYTES NFR BLD AUTO: 36 %
MCH RBC QN AUTO: 30.7 PG (ref 26.6–33)
MCHC RBC AUTO-ENTMCNC: 33.1 G/DL (ref 31.5–35.7)
MCV RBC AUTO: 93 FL (ref 79–97)
MONOCYTES # BLD AUTO: 0.8 X10E3/UL (ref 0.1–0.9)
MONOCYTES NFR BLD AUTO: 9 %
NEUTROPHILS # BLD AUTO: 4.5 X10E3/UL (ref 1.4–7)
NEUTROPHILS NFR BLD AUTO: 52 %
PLATELET # BLD AUTO: 272 X10E3/UL (ref 150–379)
POTASSIUM SERPL-SCNC: 4.1 MMOL/L (ref 3.5–5.2)
PROT SERPL-MCNC: 7.1 G/DL (ref 6–8.5)
PSA SERPL-MCNC: 0.6 NG/ML (ref 0–4)
RBC # BLD AUTO: 3.71 X10E6/UL (ref 4.14–5.8)
SODIUM SERPL-SCNC: 143 MMOL/L (ref 134–144)
TSH SERPL DL<=0.005 MIU/L-ACNC: 1.54 UIU/ML (ref 0.45–4.5)
WBC # BLD AUTO: 8.8 X10E3/UL (ref 3.4–10.8)

## 2017-10-19 NOTE — PROGRESS NOTES
Please inform caregivers labs show:   1. Worsening anemia. Could be secondary to meds but want to make sure no issue with GI bleed. Will have them take home stool test to then mail back on patient - please have her ask me for this at next appt for someone in group home. 2.  All else normal  3. TB test still pending.    Thanks,  N

## 2017-10-21 LAB
ANNOTATION COMMENT IMP: NORMAL
GAMMA INTERFERON BACKGROUND BLD IA-ACNC: 0.03 IU/ML
M TB IFN-G BLD-IMP: NEGATIVE
M TB IFN-G CD4+ BCKGRND COR BLD-ACNC: 0 IU/ML
M TB IFN-G CD4+ T-CELLS BLD-ACNC: 0.03 IU/ML
MITOGEN IGNF BLD-ACNC: 8.19 IU/ML
QUANTIFERON INCUBATION: NORMAL
SERVICE CMNT-IMP: NORMAL

## 2017-10-24 RX ORDER — POLYETHYLENE GLYCOL 3350 17 G/17G
POWDER, FOR SOLUTION ORAL
Qty: 527 G | Refills: 0 | Status: SHIPPED | OUTPATIENT
Start: 2017-10-24 | End: 2017-11-26 | Stop reason: SDUPTHER

## 2017-10-24 RX ORDER — CLOBETASOL PROPIONATE 0.5 MG/ML
LOTION TOPICAL
Qty: 59 ML | Refills: 0 | Status: SHIPPED | OUTPATIENT
Start: 2017-10-24 | End: 2017-11-26 | Stop reason: SDUPTHER

## 2017-11-07 ENCOUNTER — HOSPITAL ENCOUNTER (OUTPATIENT)
Dept: LAB | Age: 52
Discharge: HOME | End: 2017-11-07
Payer: MEDICARE

## 2017-11-07 PROCEDURE — 82270 OCCULT BLOOD FECES: CPT

## 2017-11-08 LAB
HEMOCCULT STL QL IA: NEGATIVE
PLEASE NOTE:, 188601: NORMAL

## 2017-11-09 NOTE — PROGRESS NOTES
Please inform caregivers stool test was normal.  I would like to repeat blood work again in 3 months.    Thanks,  N

## 2017-11-27 RX ORDER — CLOBETASOL PROPIONATE 0.5 MG/ML
LOTION TOPICAL
Qty: 59 ML | Refills: 0 | Status: SHIPPED | OUTPATIENT
Start: 2017-11-27 | End: 2018-03-12 | Stop reason: SDUPTHER

## 2017-11-27 RX ORDER — POLYETHYLENE GLYCOL 3350 17 G/17G
POWDER, FOR SOLUTION ORAL
Qty: 527 G | Refills: 0 | Status: SHIPPED | OUTPATIENT
Start: 2017-11-27 | End: 2017-12-18 | Stop reason: SDUPTHER

## 2017-12-04 RX ORDER — MULTIVITAMIN WITH IRON
TABLET ORAL
Qty: 31 TAB | Status: SHIPPED | OUTPATIENT
Start: 2017-12-04 | End: 2018-06-08 | Stop reason: SDUPTHER

## 2017-12-19 RX ORDER — MECLIZINE HCL 25 MG/1
TABLET, CHEWABLE ORAL
Qty: 10 TAB | Refills: 0 | Status: SHIPPED | OUTPATIENT
Start: 2017-12-19 | End: 2018-01-31

## 2017-12-19 RX ORDER — POLYETHYLENE GLYCOL 3350 17 G/17G
POWDER, FOR SOLUTION ORAL
Qty: 527 G | Refills: 0 | Status: SHIPPED | OUTPATIENT
Start: 2017-12-19 | End: 2018-01-16 | Stop reason: SDUPTHER

## 2018-01-02 ENCOUNTER — DOCUMENTATION ONLY (OUTPATIENT)
Dept: FAMILY MEDICINE CLINIC | Age: 53
End: 2018-01-02

## 2018-01-04 RX ORDER — OMEPRAZOLE 20 MG/1
CAPSULE, DELAYED RELEASE ORAL
Qty: 30 CAP | Refills: 0 | Status: SHIPPED | OUTPATIENT
Start: 2018-01-04 | End: 2018-02-06 | Stop reason: SDUPTHER

## 2018-01-16 RX ORDER — POLYETHYLENE GLYCOL 3350 17 G/17G
POWDER, FOR SOLUTION ORAL
Qty: 527 G | Refills: 5 | Status: SHIPPED | OUTPATIENT
Start: 2018-01-16 | End: 2018-01-31

## 2018-01-31 ENCOUNTER — OFFICE VISIT (OUTPATIENT)
Dept: FAMILY MEDICINE CLINIC | Age: 53
End: 2018-01-31

## 2018-01-31 ENCOUNTER — HOSPITAL ENCOUNTER (OUTPATIENT)
Dept: LAB | Age: 53
Discharge: HOME | End: 2018-01-31
Payer: MEDICARE

## 2018-01-31 VITALS
TEMPERATURE: 98.9 F | DIASTOLIC BLOOD PRESSURE: 77 MMHG | RESPIRATION RATE: 16 BRPM | SYSTOLIC BLOOD PRESSURE: 131 MMHG | HEIGHT: 64 IN | OXYGEN SATURATION: 99 % | WEIGHT: 228 LBS | BODY MASS INDEX: 38.93 KG/M2 | HEART RATE: 78 BPM

## 2018-01-31 DIAGNOSIS — D64.9 ANEMIA, UNSPECIFIED TYPE: Primary | ICD-10-CM

## 2018-01-31 DIAGNOSIS — I10 ESSENTIAL HYPERTENSION: ICD-10-CM

## 2018-01-31 PROCEDURE — 85025 COMPLETE CBC W/AUTO DIFF WBC: CPT

## 2018-01-31 PROCEDURE — 36415 COLL VENOUS BLD VENIPUNCTURE: CPT

## 2018-01-31 NOTE — PATIENT INSTRUCTIONS
Body Mass Index: Care Instructions  Your Care Instructions    Body mass index (BMI) can help you see if your weight is raising your risk for health problems. It uses a formula to compare how much you weigh with how tall you are. · A BMI lower than 18.5 is considered underweight. · A BMI between 18.5 and 24.9 is considered healthy. · A BMI between 25 and 29.9 is considered overweight. A BMI of 30 or higher is considered obese. If your BMI is in the normal range, it means that you have a lower risk for weight-related health problems. If your BMI is in the overweight or obese range, you may be at increased risk for weight-related health problems, such as high blood pressure, heart disease, stroke, arthritis or joint pain, and diabetes. If your BMI is in the underweight range, you may be at increased risk for health problems such as fatigue, lower protection (immunity) against illness, muscle loss, bone loss, hair loss, and hormone problems. BMI is just one measure of your risk for weight-related health problems. You may be at higher risk for health problems if you are not active, you eat an unhealthy diet, or you drink too much alcohol or use tobacco products. Follow-up care is a key part of your treatment and safety. Be sure to make and go to all appointments, and call your doctor if you are having problems. It's also a good idea to know your test results and keep a list of the medicines you take. How can you care for yourself at home? · Practice healthy eating habits. This includes eating plenty of fruits, vegetables, whole grains, lean protein, and low-fat dairy. · If your doctor recommends it, get more exercise. Walking is a good choice. Bit by bit, increase the amount you walk every day. Try for at least 30 minutes on most days of the week. · Do not smoke. Smoking can increase your risk for health problems. If you need help quitting, talk to your doctor about stop-smoking programs and medicines. These can increase your chances of quitting for good. · Limit alcohol to 2 drinks a day for men and 1 drink a day for women. Too much alcohol can cause health problems. If you have a BMI higher than 25  · Your doctor may do other tests to check your risk for weight-related health problems. This may include measuring the distance around your waist. A waist measurement of more than 40 inches in men or 35 inches in women can increase the risk of weight-related health problems. · Talk with your doctor about steps you can take to stay healthy or improve your health. You may need to make lifestyle changes to lose weight and stay healthy, such as changing your diet and getting regular exercise. If you have a BMI lower than 18.5  · Your doctor may do other tests to check your risk for health problems. · Talk with your doctor about steps you can take to stay healthy or improve your health. You may need to make lifestyle changes to gain or maintain weight and stay healthy, such as getting more healthy foods in your diet and doing exercises to build muscle. Where can you learn more? Go to http://jaren-chucho.info/. Enter S176 in the search box to learn more about \"Body Mass Index: Care Instructions. \"  Current as of: October 13, 2016  Content Version: 11.4  © 6727-0024 Healthwise, Incorporated. Care instructions adapted under license by [employer] (which disclaims liability or warranty for this information). If you have questions about a medical condition or this instruction, always ask your healthcare professional. [address] any warranty or liability for your use of this information.

## 2018-01-31 NOTE — PROGRESS NOTES
1. Have you been to the ER, urgent care clinic since your last visit? Hospitalized since your last visit? No    2. Have you seen or consulted any other health care providers outside of the [address] [place] [address] since your last visit? Include any pap smears or colon screening.  No     Chief Complaint   Patient presents with    Follow-up     3 months

## 2018-01-31 NOTE — PROGRESS NOTES
Chief Complaint   Patient presents with    Follow-up     3 months     he is a 48y.o. year old male who presents for evalution. Pt here for anemia recheck. No problems or concerns. No fatigue or lethargy, no change in bowel movements. Has been checking BP at home, well controlled - would like order to change from checking 4 times weekly to just once weekly. Needs D/C order for Meclizine. Reviewed PmHx, RxHx, FmHx, SocHx, AllgHx and updated and dated in the chart. Review of Systems - negative except as listed above in the HPI    Objective:     Vitals:    01/31/18 1012   BP: 131/77   Pulse: 78   Resp: 16   Temp: 98.9 °F (37.2 °C)   TempSrc: Oral   SpO2: 99%   Weight: 228 lb (103.4 kg)   Height: 5' 4\" (1.626 m)     Physical Examination: General appearance - alert, well appearing, and in no distress  Chest - clear to auscultation, no wheezes, rales or rhonchi, symmetric air entry  Heart - normal rate, regular rhythm, normal S1, S2, no murmurs, rubs, clicks or gallops  Extremities - peripheral pulses normal, no pedal edema, no clubbing or cyanosis    Assessment/ Plan:   Diagnoses and all orders for this visit:    1. Anemia, unspecified type  -     CBC WITH AUTOMATED DIFF    2. Essential hypertension    3. Class 2 obesity due to excess calories with serious comorbidity and body mass index (BMI) of 39.0 to 39.9 in adult       Pt voiced understanding regarding plan of care. Follow-up Disposition:  Return if symptoms worsen or fail to improve. I have discussed the diagnosis with the patient and the intended plan as seen in the above orders. The patient has received an after-visit summary and questions were answered concerning future plans. Medication Side Effects and Warnings were discussed with patient      Aneudy Montoya NP      Discussed the patient's BMI with him.   The BMI follow up plan is as follows:     dietary management education, guidance, and counseling  encourage exercise  monitor weight  prescribed dietary intake    An After Visit Summary was printed and given to the patient.

## 2018-02-01 LAB
BASOPHILS # BLD AUTO: 0 X10E3/UL
BASOPHILS NFR BLD AUTO: 1 %
EOSINOPHIL # BLD AUTO: 0.2 X10E3/UL
EOSINOPHIL NFR BLD AUTO: 2 %
ERYTHROCYTE [DISTWIDTH] IN BLOOD BY AUTOMATED COUNT: 15.1 %
HCT VFR BLD AUTO: 36.8 %
HGB BLD-MCNC: 11.7 G/DL
IMM GRANULOCYTES # BLD: 0 X10E3/UL
IMM GRANULOCYTES NFR BLD: 0 %
LYMPHOCYTES # BLD AUTO: 2.5 X10E3/UL
LYMPHOCYTES NFR BLD AUTO: 33 %
MCH RBC QN AUTO: 29.1 PG
MCHC RBC AUTO-ENTMCNC: 31.8 G/DL
MCV RBC AUTO: 92 FL
MONOCYTES # BLD AUTO: 0.7 X10E3/UL
MONOCYTES NFR BLD AUTO: 10 %
NEUTROPHILS # BLD AUTO: 4.1 X10E3/UL
NEUTROPHILS NFR BLD AUTO: 54 %
PLATELET # BLD AUTO: 308 X10E3/UL (ref 150–379)
RBC # BLD AUTO: 4.02 X10E6/UL
WBC # BLD AUTO: 7.6 X10E3/UL (ref 3.4–10.8)

## 2018-02-01 NOTE — PROGRESS NOTES
ID verified by caregiver(Senait)- informed labs stable and recheck in 6 mos- voiced understanding & copy of labs faxed to [phone]

## 2018-02-07 RX ORDER — OMEPRAZOLE 20 MG/1
CAPSULE, DELAYED RELEASE ORAL
Qty: 30 CAP | Refills: 0 | Status: SHIPPED | OUTPATIENT
Start: 2018-02-07 | End: 2018-03-02 | Stop reason: SDUPTHER

## 2018-03-07 RX ORDER — OMEPRAZOLE 20 MG/1
CAPSULE, DELAYED RELEASE ORAL
Qty: 30 CAP | Refills: 0 | Status: SHIPPED | OUTPATIENT
Start: 2018-03-07 | End: 2018-04-03 | Stop reason: SDUPTHER

## 2018-03-12 RX ORDER — CLOBETASOL PROPIONATE 0.5 MG/ML
LOTION TOPICAL
Qty: 50 ML | Refills: 0 | Status: SHIPPED | OUTPATIENT
Start: 2018-03-12 | End: 2018-05-11 | Stop reason: SDUPTHER

## 2018-03-18 DIAGNOSIS — Z72.89 SELF-INJURIOUS BEHAVIOR: ICD-10-CM

## 2018-03-19 RX ORDER — MUPIROCIN 20 MG/G
OINTMENT TOPICAL
Qty: 22 G | Refills: 0 | Status: SHIPPED | OUTPATIENT
Start: 2018-03-19 | End: 2018-06-08 | Stop reason: SDUPTHER

## 2018-03-28 ENCOUNTER — OFFICE VISIT (OUTPATIENT)
Dept: FAMILY MEDICINE CLINIC | Age: 53
End: 2018-03-28

## 2018-03-28 ENCOUNTER — HOSPITAL ENCOUNTER (OUTPATIENT)
Dept: LAB | Age: 53
Discharge: HOME | End: 2018-03-28
Payer: MEDICARE

## 2018-03-28 VITALS
DIASTOLIC BLOOD PRESSURE: 61 MMHG | HEIGHT: 64 IN | OXYGEN SATURATION: 99 % | WEIGHT: 229 LBS | TEMPERATURE: 98.9 F | BODY MASS INDEX: 39.09 KG/M2 | SYSTOLIC BLOOD PRESSURE: 94 MMHG | RESPIRATION RATE: 16 BRPM | HEART RATE: 87 BPM

## 2018-03-28 DIAGNOSIS — R41.0 CONFUSION: ICD-10-CM

## 2018-03-28 DIAGNOSIS — R46.89 BEHAVIORAL CHANGE: Primary | ICD-10-CM

## 2018-03-28 DIAGNOSIS — Z51.81 MEDICATION MONITORING ENCOUNTER: ICD-10-CM

## 2018-03-28 DIAGNOSIS — I10 ESSENTIAL HYPERTENSION: ICD-10-CM

## 2018-03-28 PROBLEM — E66.01 SEVERE OBESITY (BMI 35.0-39.9) WITH COMORBIDITY (HCC): Status: ACTIVE | Noted: 2018-03-28

## 2018-03-28 PROCEDURE — 85025 COMPLETE CBC W/AUTO DIFF WBC: CPT

## 2018-03-28 PROCEDURE — 80053 COMPREHEN METABOLIC PANEL: CPT

## 2018-03-28 PROCEDURE — 80178 ASSAY OF LITHIUM: CPT

## 2018-03-28 PROCEDURE — 80164 ASSAY DIPROPYLACETIC ACD TOT: CPT

## 2018-03-28 PROCEDURE — 84443 ASSAY THYROID STIM HORMONE: CPT

## 2018-03-28 RX ORDER — NITROFURANTOIN 25; 75 MG/1; MG/1
100 CAPSULE ORAL 2 TIMES DAILY
Qty: 10 CAP | Refills: 0 | Status: SHIPPED | OUTPATIENT
Start: 2018-03-28 | End: 2018-09-14

## 2018-03-28 NOTE — PATIENT INSTRUCTIONS
Start checking BP at home, if remains low (less than 120/80)  then decrease Lotrel to 1/2 tab daily and cont to monitor daily.

## 2018-03-28 NOTE — PROGRESS NOTES
Chief Complaint   Patient presents with    Behavioral Problem     x5 days     he is a 48y.o. year old male who presents for evalution. Pt has been very upset and aggitated for past 5 days. Psychiatrist requested F/U here, also has appt there for later this afternoon. Has been talking about his Mom a lot, acting like he is going to fall down. Has been trying to leave house in middle of night. Yelling all the time. Reviewed PmHx, RxHx, FmHx, SocHx, AllgHx and updated and dated in the chart. Review of Systems - negative except as listed above in the HPI    Objective:     Vitals:    03/28/18 1007   BP: 94/61   Pulse: 87   Resp: 16   Temp: 98.9 °F (37.2 °C)   TempSrc: Oral   SpO2: 99%   Weight: 229 lb (103.9 kg)   Height: 5' 4\" (1.626 m)     Physical Examination: General appearance - alert, well appearing, and in no distress, anxious and uncooperative  Mental status - anxious, agitated, confused, uncooperative  Chest - clear to auscultation, no wheezes, rales or rhonchi, symmetric air entry  Heart - normal rate, regular rhythm, normal S1, S2, no murmurs, rubs, clicks or gallops  Abdomen - soft, nontender, nondistended, no masses or organomegaly    Assessment/ Plan:   Diagnoses and all orders for this visit:    1. Behavioral change  -     CBC WITH AUTOMATED DIFF  -     TSH 3RD GENERATION  -     METABOLIC PANEL, COMPREHENSIVE  -     nitrofurantoin, macrocrystal-monohydrate, (MACROBID) 100 mg capsule; Take 1 Cap by mouth two (2) times a day. Labs pending. Pt refusing to leave urine sample. Will cover for UTI while we wait for labs. F/U with psych this afternoon     2. Confusion  -     CBC WITH AUTOMATED DIFF  -     TSH 3RD GENERATION  -     METABOLIC PANEL, COMPREHENSIVE  Unclear etiology    3. Medication monitoring encounter  -     LITHIUM  -     VALPROIC ACID  Labs pending. 4. Essential hypertension  Slightly low today but pt agitated while wearing cuff and kept trying to remove.   Monitor at home, if persistently below 120/80 then decrease Lotrel to 1/2 tab. F/U prn     Pt voiced understanding regarding plan of care. Follow-up Disposition:  Return if symptoms worsen or fail to improve. I have discussed the diagnosis with the patient and the intended plan as seen in the above orders. The patient has received an after-visit summary and questions were answered concerning future plans.      Medication Side Effects and Warnings were discussed with patient    Nery Ingram NP

## 2018-03-28 NOTE — LETTER
3/29/2018 3:46 PM 
 
Mr. Olya Villalpando [address] [place] [id number] Dear Olya Villalpando: 
 
Please find your most recent results below. Resulted Orders CBC WITH AUTOMATED DIFF Result Value Ref Range WBC 6.9 3.4 - 10.8 x10E3/uL  
 RBC 3.99 (L) 4.14 - 5.80 x10E6/uL HGB 11.9 (L) 13.0 - 17.7 g/dL HCT 36.7 (L) 37.5 - 51.0 % MCV 92 79 - 97 fL  
 MCH 29.8 26.6 - 33.0 pg  
 MCHC 32.4 31.5 - 35.7 g/dL  
 RDW 14.9 12.3 - 15.4 % PLATELET 584 779 - 956 x10E3/uL NEUTROPHILS 47 Not Estab. % Lymphocytes 36 Not Estab. % MONOCYTES 10 Not Estab. % EOSINOPHILS 5 Not Estab. % BASOPHILS 2 Not Estab. %  
 ABS. NEUTROPHILS 3.3 1.4 - 7.0 x10E3/uL Abs Lymphocytes 2.5 0.7 - 3.1 x10E3/uL  
 ABS. MONOCYTES 0.7 0.1 - 0.9 x10E3/uL  
 ABS. EOSINOPHILS 0.4 0.0 - 0.4 x10E3/uL  
 ABS. BASOPHILS 0.1 0.0 - 0.2 x10E3/uL IMMATURE GRANULOCYTES 0 Not Estab. %  
 ABS. IMM. GRANS. 0.0 0.0 - 0.1 x10E3/uL Narrative Performed at:  [address]  [id number] : Dany Phillip MD, Phone:  [phone] TSH 3RD GENERATION Result Value Ref Range TSH 3.680 0.450 - 4.500 uIU/mL Narrative Performed at:  [address]  [id number] : Dany Phillip MD, Phone:  [phone] METABOLIC PANEL, COMPREHENSIVE Result Value Ref Range Glucose 92 65 - 99 mg/dL BUN 14 6 - 24 mg/dL Creatinine 1.06 0.76 - 1.27 mg/dL GFR est non-AA 80 >59 mL/min/1.73 GFR est AA 92 >59 mL/min/1.73  
 BUN/Creatinine ratio 13 9 - 20 Sodium 140 134 - 144 mmol/L Potassium 4.2 3.5 - 5.2 mmol/L Chloride 102 96 - 106 mmol/L  
 CO2 23 18 - 29 mmol/L Calcium 9.8 8.7 - 10.2 mg/dL Protein, total 7.6 6.0 - 8.5 g/dL Albumin 4.4 3.5 - 5.5 g/dL GLOBULIN, TOTAL 3.2 1.5 - 4.5 g/dL A-G Ratio 1.4 1.2 - 2.2 Bilirubin, total <0.2 0.0 - 1.2 mg/dL Alk.  phosphatase 61 39 - 117 IU/L  
 AST (SGOT) 26 0 - 40 IU/L  
 ALT (SGPT) 21 0 - 44 IU/L Narrative Performed at:  [address]  [id number] : Dean Schofield MD, Phone:  [phone] LITHIUM Result Value Ref Range Lithium level 1.0 0.6 - 1.2 mmol/L Comment:  
                                    Detection Limit = 0.1 
                          <0.1 indicates None Detected Narrative Performed at:  [address]  [id number] : Dean Schofield MD, Phone:  [phone] VALPROIC ACID Result Value Ref Range Valproic acid 67 50 - 100 ug/mL Comment:  
                                   Detection Limit = 4 
                           <4 indicates None Detected Toxicity may occur at levels of 100-500. Measurements 
of free unbound valproic acid may improve the assess- 
ment of clinical response. Narrative Performed at:  [address]  [id number] : Dean Schofield MD, Phone:  [phone] RECOMMENDATIONS: 
Please call me if you have any questions: [phone] Sincerely, Latanya Vásquez NP

## 2018-03-28 NOTE — MR AVS SNAPSHOT
[address] [address] 
[phone] Patient: Moncho Holbrook MRN:  :[id number] Visit Information Date & Time Provider Department Dept. Phone Encounter #  
 3/28/2018  9:45 AM Michelle Dean NP [address] [facility] [phone] [id number] Follow-up Instructions Return if symptoms worsen or fail to improve. Upcoming Health Maintenance Date Due COLONOSCOPY 12/3/2025 DTaP/Tdap/Td series (2 - Td) 10/6/2026 Allergies as of 3/28/2018  Review Complete On: 3/28/2018 By: Tristan Siemens, LPN No Known Allergies Current Immunizations  Reviewed on 10/27/2015 Name Date Influenza Vaccine Yaquelin Dellen) 10/18/2017 Influenza Vaccine (Quad) PF 10/6/2016 PPD 2012, 2011, 2010 TB Skin Test (PPD) Intradermal 10/27/2015, 2013 Not reviewed this visit You Were Diagnosed With   
  
 Codes Comments Behavioral change    -  Primary ICD-10-CM: R46.89 
ICD-9-CM: 312.9 Confusion     ICD-10-CM: R41.0 ICD-9-CM: 298.9 Medication monitoring encounter     ICD-10-CM: Z51.81 
ICD-9-CM: V58.83 Essential hypertension     ICD-10-CM: I10 
ICD-9-CM: 401.9 Vitals BP Pulse Temp Resp Height(growth percentile) Weight(growth percentile) 94/61 87 98.9 °F (37.2 °C) (Oral) 16 5' 4\" (1.626 m) 229 lb (103.9 kg) SpO2 BMI Smoking Status 99% 39.31 kg/m2 Former Smoker Vitals History BMI and BSA Data Body Mass Index Body Surface Area  
 39.31 kg/m 2 2.17 m 2 Preferred Pharmacy Pharmacy Name Phone [facility] [phone] Your Updated Medication List  
  
   
This list is accurate as of 3/28/18 10:34 AM.  Always use your most recent med list. amLODIPine-benazepril 10-20 mg per capsule Commonly known as:  LOTREL  
TAKE ONE CAPSULE BY MOUTH DAILY aspirin delayed-release 81 mg tablet TAKE ONE TABLET BY MOUTH DAILY. clobetasol 0.05 % lotion Commonly known as:  CLOBEX  
APPLY TO AFFECTED AREA 2 TIMES A DAY AS DIRECTED. DEPAKOTE  mg ER tablet Generic drug:  divalproex ER Take  by mouth.  
  
 escitalopram oxalate 20 mg tablet Commonly known as:  Sy Socorro Take 1 Tab by mouth daily. ketoconazole 2 % shampoo Commonly known as:  NIZORAL Apply  to affected area daily as needed. * LORazepam 1 mg tablet Commonly known as:  ATIVAN Take 1 Tab by mouth three (3) times daily. * LORazepam 2 mg tablet Commonly known as:  ATIVAN Take 2 tablets by mouth nightly as needed for Anxiety. MAX per day is 10mg  
  
 mometasone 0.1 % topical cream  
Commonly known as:  Merlin Manna Apply  to affected area daily. multivitamin with iron tablet Commonly known as:  TAB-A-SANTANA/IRON  
TAKE 1 TABLET BY MOUTH DAILY  
  
 mupirocin 2 % ointment Commonly known as:  BACTROBAN  
APPLY AFFECTED AREA DAILY IF NEEDED. nitrofurantoin (macrocrystal-monohydrate) 100 mg capsule Commonly known as:  MACROBID Take 1 Cap by mouth two (2) times a day. * OLANZapine 15 mg tablet Commonly known as:  ZyPREXA Take 15 mg by mouth nightly. * OLANZapine 5 mg tablet Commonly known as:  ZyPREXA Take  by mouth nightly. omeprazole 20 mg capsule Commonly known as:  PRILOSEC  
TAKE ONE CAPSULE BY MOUTH DAILY  
  
 OTHER Attends extra large pull-up, use as needed. Dx: 318.0  
  
 polyethylene glycol 17 gram packet Commonly known as:  Mahnaz Clemensder Take 1 Packet by mouth daily. QUEtiapine 400 mg tablet Commonly known as:  SEROquel  
  
 risperiDONE 4 mg tablet Commonly known as:  RisperDAL Take  by mouth.  
  
 sucralfate 1 gram tablet Commonly known as:  CARAFATE  
TAKE (1) TABLET BY MOUTH THREE TIMES DAILY Therapy M 9 mg iron-400 mcg Tab tablet Generic drug:  multivitamin, tx-iron-ca-min TAKE ONE TABLET BY MOUTH DAILY. * Notice: This list has 4 medication(s) that are the same as other medications prescribed for you. Read the directions carefully, and ask your doctor or other care provider to review them with you. Prescriptions Sent to Pharmacy Refills  
 nitrofurantoin, macrocrystal-monohydrate, (MACROBID) 100 mg capsule 0 Sig: Take 1 Cap by mouth two (2) times a day. Class: Normal  
 Pharmacy: [address] [place], [place] [address]way[facility] #: [phone] Route: Oral  
  
We Performed the Following CBC WITH AUTOMATED DIFF [99143 CPT(R)] LITHIUM [id number] CPT(R)] METABOLIC PANEL, COMPREHENSIVE [87661 CPT(R)] TSH 3RD GENERATION [38617 CPT(R)] VALPROIC ACID [99554 CPT(R)] Follow-up Instructions Return if symptoms worsen or fail to improve. Patient Instructions Start checking BP at home, if remains low (less than 120/80)  then decrease Lotrel to 1/2 tab daily and cont to monitor daily. Introducing [facility] & HEALTH SERVICES! Dear Ilia Rivera: Thank you for requesting a [employer] account. Our records indicate that you have previously registered for a [employer] account but its currently inactive. Please call our [employer] support line at [phone]. Additional Information If you have questions, please visit the Frequently Asked Questions section of the [employer] website at [email or web address] [email or web address]/. Remember, [employer] is NOT to be used for urgent needs. For medical emergencies, dial 911. Now available from your iPhone and Android! Please provide this summary of care documentation to your next provider. Your primary care clinician is listed as Nery Ingram. If you have any questions after today's visit, please call [phone].

## 2018-03-28 NOTE — PROGRESS NOTES
1. Have you been to the ER, urgent care clinic since your last visit? Hospitalized since your last visit? No    2. Have you seen or consulted any other health care providers outside of the [address] [place] [address] since your last visit? Include any pap smears or colon screening.  no  Chief Complaint   Patient presents with    Behavioral Problem     x5 days

## 2018-03-29 ENCOUNTER — TELEPHONE (OUTPATIENT)
Dept: FAMILY MEDICINE CLINIC | Age: 53
End: 2018-03-29

## 2018-03-29 LAB
ALBUMIN SERPL-MCNC: 4.4 G/DL (ref 3.5–5.5)
ALBUMIN/GLOB SERPL: 1.4 {RATIO} (ref 1.2–2.2)
ALP SERPL-CCNC: 61 IU/L (ref 39–117)
ALT SERPL-CCNC: 21 IU/L (ref 0–44)
AST SERPL-CCNC: 26 IU/L (ref 0–40)
BASOPHILS # BLD AUTO: 0.1 X10E3/UL (ref 0–0.2)
BASOPHILS NFR BLD AUTO: 2 %
BILIRUB SERPL-MCNC: <0.2 MG/DL (ref 0–1.2)
BUN SERPL-MCNC: 14 MG/DL (ref 6–24)
BUN/CREAT SERPL: 13 (ref 9–20)
CALCIUM SERPL-MCNC: 9.8 MG/DL (ref 8.7–10.2)
CHLORIDE SERPL-SCNC: 102 MMOL/L (ref 96–106)
CO2 SERPL-SCNC: 23 MMOL/L (ref 18–29)
CREAT SERPL-MCNC: 1.06 MG/DL (ref 0.76–1.27)
EOSINOPHIL # BLD AUTO: 0.4 X10E3/UL (ref 0–0.4)
EOSINOPHIL NFR BLD AUTO: 5 %
ERYTHROCYTE [DISTWIDTH] IN BLOOD BY AUTOMATED COUNT: 14.9 % (ref 12.3–15.4)
GFR SERPLBLD CREATININE-BSD FMLA CKD-EPI: 80 ML/MIN/1.73
GFR SERPLBLD CREATININE-BSD FMLA CKD-EPI: 92 ML/MIN/1.73
GLOBULIN SER CALC-MCNC: 3.2 G/DL (ref 1.5–4.5)
GLUCOSE SERPL-MCNC: 92 MG/DL (ref 65–99)
HCT VFR BLD AUTO: 36.7 % (ref 37.5–51)
HGB BLD-MCNC: 11.9 G/DL (ref 13–17.7)
IMM GRANULOCYTES # BLD: 0 X10E3/UL (ref 0–0.1)
IMM GRANULOCYTES NFR BLD: 0 %
LITHIUM SERPL-SCNC: 1 MMOL/L (ref 0.6–1.2)
LYMPHOCYTES # BLD AUTO: 2.5 X10E3/UL (ref 0.7–3.1)
LYMPHOCYTES NFR BLD AUTO: 36 %
MCH RBC QN AUTO: 29.8 PG (ref 26.6–33)
MCHC RBC AUTO-ENTMCNC: 32.4 G/DL (ref 31.5–35.7)
MCV RBC AUTO: 92 FL (ref 79–97)
MONOCYTES # BLD AUTO: 0.7 X10E3/UL (ref 0.1–0.9)
MONOCYTES NFR BLD AUTO: 10 %
NEUTROPHILS # BLD AUTO: 3.3 X10E3/UL (ref 1.4–7)
NEUTROPHILS NFR BLD AUTO: 47 %
PLATELET # BLD AUTO: 282 X10E3/UL (ref 150–379)
POTASSIUM SERPL-SCNC: 4.2 MMOL/L (ref 3.5–5.2)
PROT SERPL-MCNC: 7.6 G/DL (ref 6–8.5)
RBC # BLD AUTO: 3.99 X10E6/UL (ref 4.14–5.8)
SODIUM SERPL-SCNC: 140 MMOL/L (ref 134–144)
TSH SERPL DL<=0.005 MIU/L-ACNC: 3.68 UIU/ML (ref 0.45–4.5)
VALPROATE SERPL-MCNC: 67 UG/ML (ref 50–100)
WBC # BLD AUTO: 6.9 X10E3/UL (ref 3.4–10.8)

## 2018-03-29 NOTE — PROGRESS NOTES
Please inform caregiver labs show:   1. Slight anemia but stable, likely secondary to medication. Cont to monitor.    2.  All else normal.   Thanks,  N

## 2018-03-29 NOTE — PROGRESS NOTES
Called caregiver, pt ID x2. Caregiver informed of results and verbalized understanding. Please Print.

## 2018-04-04 RX ORDER — OMEPRAZOLE 20 MG/1
CAPSULE, DELAYED RELEASE ORAL
Qty: 30 CAP | Refills: 0 | Status: SHIPPED | OUTPATIENT
Start: 2018-04-04 | End: 2018-05-02 | Stop reason: SDUPTHER

## 2018-05-11 RX ORDER — CLOBETASOL PROPIONATE 0.5 MG/ML
LOTION TOPICAL
Qty: 50 ML | Refills: 0 | Status: SHIPPED | OUTPATIENT
Start: 2018-05-11 | End: 2018-10-19 | Stop reason: SDUPTHER

## 2018-06-18 DIAGNOSIS — I10 ESSENTIAL HYPERTENSION: ICD-10-CM

## 2018-06-18 RX ORDER — SUCRALFATE 1 G/1
TABLET ORAL
Qty: 90 TAB | Refills: 5 | Status: SHIPPED | OUTPATIENT
Start: 2018-06-18 | End: 2018-10-17 | Stop reason: SDUPTHER

## 2018-06-18 RX ORDER — OMEPRAZOLE 20 MG/1
CAPSULE, DELAYED RELEASE ORAL
Qty: 30 CAP | Refills: 5 | Status: SHIPPED | OUTPATIENT
Start: 2018-06-18 | End: 2018-10-19 | Stop reason: SDUPTHER

## 2018-06-18 RX ORDER — AMLODIPINE AND BENAZEPRIL HYDROCHLORIDE 10; 20 MG/1; MG/1
CAPSULE ORAL
Qty: 30 CAP | Refills: 5 | Status: SHIPPED | OUTPATIENT
Start: 2018-06-18 | End: 2019-06-14

## 2018-07-26 ENCOUNTER — OFFICE VISIT (OUTPATIENT)
Dept: FAMILY MEDICINE CLINIC | Age: 53
End: 2018-07-26

## 2018-07-26 DIAGNOSIS — F39 MOOD DISORDER (HCC): Primary | ICD-10-CM

## 2018-07-26 NOTE — PROGRESS NOTES
1. Have you been to the ER, urgent care clinic since your last visit? Hospitalized since your last visit? No    2. Have you seen or consulted any other health care providers outside of the [facility] since your last visit? Include any pap smears or colon screening.  No   Chief Complaint   Patient presents with    Injection     Estel Sender 156mg/ml, IM RG Lot: [id number] Exp: , [facility]

## 2018-09-14 ENCOUNTER — OFFICE VISIT (OUTPATIENT)
Dept: FAMILY MEDICINE CLINIC | Age: 53
End: 2018-09-14

## 2018-09-14 ENCOUNTER — TELEPHONE (OUTPATIENT)
Dept: FAMILY MEDICINE CLINIC | Age: 53
End: 2018-09-14

## 2018-09-14 VITALS
TEMPERATURE: 98 F | BODY MASS INDEX: 34.66 KG/M2 | HEART RATE: 98 BPM | RESPIRATION RATE: 18 BRPM | SYSTOLIC BLOOD PRESSURE: 138 MMHG | HEIGHT: 64 IN | WEIGHT: 203 LBS | DIASTOLIC BLOOD PRESSURE: 83 MMHG | OXYGEN SATURATION: 96 %

## 2018-09-14 DIAGNOSIS — R63.4 WEIGHT LOSS: ICD-10-CM

## 2018-09-14 DIAGNOSIS — J69.0 ASPIRATION PNEUMONIA, UNSPECIFIED ASPIRATION PNEUMONIA TYPE, UNSPECIFIED LATERALITY, UNSPECIFIED PART OF LUNG (HCC): ICD-10-CM

## 2018-09-14 DIAGNOSIS — K59.00 CONSTIPATION, UNSPECIFIED CONSTIPATION TYPE: ICD-10-CM

## 2018-09-14 DIAGNOSIS — R45.1 AGITATION: ICD-10-CM

## 2018-09-14 DIAGNOSIS — R56.9 SEIZURES (HCC): ICD-10-CM

## 2018-09-14 DIAGNOSIS — F39 MOOD DISORDER (HCC): Primary | ICD-10-CM

## 2018-09-14 DIAGNOSIS — I10 ESSENTIAL HYPERTENSION: ICD-10-CM

## 2018-09-14 RX ORDER — LEVETIRACETAM 1000 MG/1
TABLET ORAL 2 TIMES DAILY
COMMUNITY
End: 2018-10-19 | Stop reason: DRUGHIGH

## 2018-09-14 RX ORDER — LORAZEPAM 1 MG/1
1 TABLET ORAL
Qty: 14 TAB | Refills: 0 | OUTPATIENT
Start: 2018-09-14 | End: 2018-10-19 | Stop reason: SDUPTHER

## 2018-09-14 RX ORDER — DOCUSATE SODIUM 100 MG/1
100 CAPSULE, LIQUID FILLED ORAL
Qty: 60 CAP | Refills: 2 | Status: SHIPPED | OUTPATIENT
Start: 2018-09-14 | End: 2018-12-13

## 2018-09-14 RX ORDER — SERTRALINE HYDROCHLORIDE 100 MG/1
TABLET, FILM COATED ORAL DAILY
COMMUNITY
End: 2018-10-19 | Stop reason: SDUPTHER

## 2018-09-14 NOTE — PROGRESS NOTES
Pt here with group home counselor to f/u from [facility]. Counselor requesting a referral to neurologist for seizures. Would like to discuss current medications. States previous RXs were discontinued when pt was in [facility].

## 2018-09-14 NOTE — MR AVS SNAPSHOT
[address] [place] [address] 
[phone] Patient: Genet Becerra MRN:  :[id number] Visit Information Date & Time Provider Department Dept. Phone Encounter #  
 2018  1:30 PM Joe Palomo NP [address] [facility] [phone] [id number] Follow-up Instructions Return in about 1 month (around 10/14/2018) for HTN recheck. Upcoming Health Maintenance Date Due Influenza Age 5 to Adult 2018 MEDICARE YEARLY EXAM 10/19/2018 COLONOSCOPY 12/3/2025 DTaP/Tdap/Td series (2 - Td) 10/6/2026 Allergies as of 2018  Review Complete On: 2018 By: Joe Palomo NP No Known Allergies Current Immunizations  Reviewed on 10/27/2015 Name Date Influenza Vaccine Stivenleen Joan) 10/18/2017 Influenza Vaccine (Quad) PF 10/6/2016 PPD 2012, 2011, 2010 TB Skin Test (PPD) Intradermal 10/27/2015, 2013 Not reviewed this visit You Were Diagnosed With   
  
 Codes Comments Mood disorder ([facility]    -  Primary ICD-10-CM: F39 
ICD-9-CM: 296.90 Constipation, unspecified constipation type     ICD-10-CM: K59.00 ICD-9-CM: 564.00 Essential hypertension     ICD-10-CM: I10 
ICD-9-CM: 401.9 Agitation     ICD-10-CM: R45.1 ICD-9-CM: 307.9 Aspiration pneumonia, unspecified aspiration pneumonia type, unspecified laterality, unspecified part of lung ([facility]     ICD-10-CM: J69.0 ICD-9-CM: 507.0 Vitals BP Pulse Temp Resp Height(growth percentile) Weight(growth percentile) 138/83 (BP 1 Location: Left arm, BP Patient Position: Sitting) 98 98 °F (36.7 °C) (Oral) 18 5' 4\" (1.626 m) 203 lb (92.1 kg) SpO2 BMI Smoking Status 96% 34.84 kg/m2 Former Smoker Vitals History BMI and BSA Data Body Mass Index Body Surface Area 34.84 kg/m 2 2.04 m 2 Preferred Pharmacy Pharmacy Name Phone [address] [place] [address] [phone] Your Updated Medication List  
  
   
This list is accurate as of 9/14/18  1:58 PM.  Always use your most recent med list. amLODIPine-benazepril 10-20 mg per capsule Commonly known as:  LOTREL  
TAKE ONE CAPSULE BY MOUTH DAILY  
  
 aspirin delayed-release 81 mg tablet TAKE ONE TABLET BY MOUTH DAILY. clobetasol 0.05 % lotion Commonly known as:  CLOBEX  
APPLY TO AFFECTED AREA 2 TIMES A DAY AS DIRECTED. DEPAKOTE  mg ER tablet Generic drug:  divalproex ER Take  by mouth. docusate sodium 100 mg capsule Commonly known as:  Gevena Living Take 1 Cap by mouth daily as needed for Constipation for up to 90 days. escitalopram oxalate 20 mg tablet Commonly known as:  Pollyann Carver Take 1 Tab by mouth daily. KEPPRA 1,000 mg tablet Generic drug:  levETIRAcetam  
Take  by mouth two (2) times a day.  
  
 ketoconazole 2 % shampoo Commonly known as:  NIZORAL Apply  to affected area daily as needed. LORazepam 1 mg tablet Commonly known as:  ATIVAN Take 1 Tab by mouth two (2) times daily as needed for Anxiety. Max Daily Amount: 2 mg.  
  
 mometasone 0.1 % topical cream  
Commonly known as:  [place] Forester Apply  to affected area daily. multivitamin, tx-iron-ca-min 9 mg iron-400 mcg Tab tablet Commonly known as: Therapy M  
TAKE ONE TABLET BY MOUTH DAILY. mupirocin 2 % ointment Commonly known as:  BACTROBAN  
APPLY AFFECTED AREA DAILY IF NEEDED. nitrofurantoin (macrocrystal-monohydrate) 100 mg capsule Commonly known as:  MACROBID Take 1 Cap by mouth two (2) times a day. * OLANZapine 15 mg tablet Commonly known as:  ZyPREXA Take 15 mg by mouth nightly. * OLANZapine 5 mg tablet Commonly known as:  ZyPREXA Take  by mouth nightly. omeprazole 20 mg capsule Commonly known as:  PRILOSEC  
TAKE ONE CAPSULE BY MOUTH DAILY  
  
 OTHER  
 Attends extra large pull-up, use as needed. Dx: 318.0  
  
 polyethylene glycol 17 gram packet Commonly known as:  Marilu Mt Take 1 Packet by mouth daily. QUEtiapine 400 mg tablet Commonly known as:  SEROquel  
  
 risperiDONE 4 mg tablet Commonly known as:  RisperDAL Take  by mouth.  
  
 sucralfate 1 gram tablet Commonly known as:  CARAFATE  
TAKE (1) TABLET BY MOUTH THREE TIMES DAILY ZOLOFT 100 mg tablet Generic drug:  sertraline Take  by mouth daily. * Notice: This list has 2 medication(s) that are the same as other medications prescribed for you. Read the directions carefully, and ask your doctor or other care provider to review them with you. Prescriptions Sent to Pharmacy Refills  
 docusate sodium (COLACE) 100 mg capsule 2 Sig: Take 1 Cap by mouth daily as needed for Constipation for up to 90 days. Class: Normal  
 Pharmacy: [address] [place] [address] #: [phone] Route: Oral  
  
Follow-up Instructions Return in about 1 month (around 10/14/2018) for HTN recheck. Introducing [facility] & HEALTH SERVICES! Dear Luna Montero: Thank you for requesting a [employer] account. Our records indicate that you have previously registered for a [employer] account but its currently inactive. Please call our [employer] support line at [phone]. Additional Information If you have questions, please visit the Frequently Asked Questions section of the [employer] website at [email or web address]/. Remember, [employer] is NOT to be used for urgent needs. For medical emergencies, dial 911. Now available from your iPhone and Android! Please provide this summary of care documentation to your next provider. Your primary care clinician is listed as Vern Oneal. If you have any questions after today's visit, please call [phone].
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
## 2018-10-17 ENCOUNTER — OFFICE VISIT (OUTPATIENT)
Dept: FAMILY MEDICINE CLINIC | Age: 53
End: 2018-10-17

## 2018-10-17 VITALS — BODY MASS INDEX: 34.31 KG/M2 | HEIGHT: 64 IN | WEIGHT: 201 LBS

## 2018-10-17 DIAGNOSIS — K21.9 GASTROESOPHAGEAL REFLUX DISEASE WITHOUT ESOPHAGITIS: ICD-10-CM

## 2018-10-17 DIAGNOSIS — F39 MOOD DISORDER (HCC): ICD-10-CM

## 2018-10-17 DIAGNOSIS — K59.00 CONSTIPATION, UNSPECIFIED CONSTIPATION TYPE: ICD-10-CM

## 2018-10-17 DIAGNOSIS — R46.89 CHANGE IN BEHAVIOR: Primary | ICD-10-CM

## 2018-10-17 DIAGNOSIS — F41.1 ANXIETY STATE: ICD-10-CM

## 2018-10-17 RX ORDER — POLYETHYLENE GLYCOL 3350 17 G/17G
17 POWDER, FOR SOLUTION ORAL
Qty: 12 EACH | Refills: 11 | Status: SHIPPED | OUTPATIENT
Start: 2018-10-17 | End: 2019-06-14

## 2018-10-17 RX ORDER — SUCRALFATE 1 G/1
TABLET ORAL
Qty: 90 TAB | Refills: 1 | Status: SHIPPED | OUTPATIENT
Start: 2018-10-17 | End: 2018-10-19 | Stop reason: SDUPTHER

## 2018-10-17 NOTE — PROGRESS NOTES
Chief Complaint   Patient presents with   [address]     he is a 47y.o. year old male who presents for evalution. Caregiver would like Miralax changed from daily to 3 times weekly instead- having too many bowel movements. Also has been taking Carafate TID and unsure if needs. Would like to discuss. Pt has been back in [address] again, then was in [facility] for another week or two. Still having lots of agitation and aggressive, also still acting like having memory loss - won't know where to sit or how to get into bed. Has F/U appt with Neuro on Oct 30th for evaluation of possible hypoxic brain injury. Reviewed PmHx, RxHx, FmHx, SocHx, AllgHx and updated and dated in the chart. Review of Systems - negative except as listed above in the HPI    Objective:     Vitals:    10/17/18 1132   Weight: 201 lb (91.2 kg)   Height: 5' 4\" (1.626 m)     Physical Examination: General appearance - alert, well appearing, and in no distress, anxious and uncooperative  Mental status - alert, oriented to person, place, and time, anxious, agitated, uncooperative, inappropriate safety awareness  Neurological - abnormal neurological exam unchanged from prior examinations  Aside from changes in patient mood which are extreme for him - lots of yelling and aggressive posturing during appointment     Assessment/ Plan:   Diagnoses and all orders for this visit:    Change in behavior  Mood disorder [facility])  Anxiety state  Keep F/U with Neuro and Psychiatry. F/U prn    Constipation, unspecified constipation type  -     polyethylene glycol (MIRALAX) 17 gram packet; Take 1 Packet by mouth every Monday, Wednesday, Friday. Change in dosing. Gastroesophageal reflux disease without esophagitis  -     sucralfate (CARAFATE) 1 gram tablet; TAKE (1) TABLET BY MOUTH THREE TIMES DAILY prn  Change in dosing to prn - start taking away doses and see it pt feels poorly.        Pt voiced understanding regarding plan of care. Follow-up Disposition:  Return if symptoms worsen or fail to improve. I have discussed the diagnosis with the patient and the intended plan as seen in the above orders. The patient has received an after-visit summary and questions were answered concerning future plans.      Medication Side Effects and Warnings were discussed with patient    Margo Pool NP

## 2018-10-17 NOTE — PROGRESS NOTES
1. Have you been to the ER, urgent care clinic since your last visit? Hospitalized since your last visit? Yes, [facility], 8/6/18    2. Have you seen or consulted any other health care providers outside of the [address] [place] [address] since your last visit? Include any pap smears or colon screening.  No     Chief Complaint   Patient presents with   [address]

## 2018-10-19 DIAGNOSIS — F39 MOOD DISORDER (HCC): ICD-10-CM

## 2018-10-19 DIAGNOSIS — K21.9 GASTROESOPHAGEAL REFLUX DISEASE WITHOUT ESOPHAGITIS: ICD-10-CM

## 2018-10-19 DIAGNOSIS — R45.1 AGITATION: ICD-10-CM

## 2018-10-19 RX ORDER — CLOBETASOL PROPIONATE 0.5 MG/ML
LOTION TOPICAL
Qty: 50 ML | Refills: 0 | Status: SHIPPED | OUTPATIENT
Start: 2018-10-19 | End: 2018-10-22 | Stop reason: SDUPTHER

## 2018-10-19 RX ORDER — LORAZEPAM 1 MG/1
1 TABLET ORAL
Qty: 120 TAB | Refills: 0 | OUTPATIENT
Start: 2018-10-19 | End: 2019-06-14

## 2018-10-19 RX ORDER — SUCRALFATE 1 G/1
TABLET ORAL
Qty: 90 TAB | Refills: 2 | Status: SHIPPED | OUTPATIENT
Start: 2018-10-19 | End: 2019-01-01 | Stop reason: SDUPTHER

## 2018-10-19 RX ORDER — SERTRALINE HYDROCHLORIDE 100 MG/1
100 TABLET, FILM COATED ORAL DAILY
Qty: 30 TAB | Refills: 2 | Status: SHIPPED | OUTPATIENT
Start: 2018-10-19 | End: 2019-06-14

## 2018-10-19 RX ORDER — LEVETIRACETAM 500 MG/1
500 TABLET ORAL 2 TIMES DAILY
Qty: 60 TAB | Refills: 2 | Status: SHIPPED | OUTPATIENT
Start: 2018-10-19 | End: 2019-01-23

## 2018-10-19 RX ORDER — OMEPRAZOLE 20 MG/1
CAPSULE, DELAYED RELEASE ORAL
Qty: 30 CAP | Refills: 5 | Status: SHIPPED | OUTPATIENT
Start: 2018-10-19 | End: 2019-06-14

## 2018-10-19 RX ORDER — QUETIAPINE FUMARATE 200 MG/1
200 TABLET, FILM COATED ORAL
Qty: 30 TAB | Refills: 2 | Status: SHIPPED | OUTPATIENT
Start: 2018-10-19

## 2018-10-22 RX ORDER — CLOBETASOL PROPIONATE 0.5 MG/ML
LOTION TOPICAL
Qty: 50 ML | Refills: 0 | Status: SHIPPED | OUTPATIENT
Start: 2018-10-22 | End: 2019-06-14

## 2018-10-30 ENCOUNTER — OFFICE VISIT (OUTPATIENT)
Dept: NEUROLOGY | Age: 53
End: 2018-10-30

## 2018-10-30 VITALS
OXYGEN SATURATION: 95 % | WEIGHT: 200 LBS | HEART RATE: 91 BPM | DIASTOLIC BLOOD PRESSURE: 88 MMHG | BODY MASS INDEX: 34.15 KG/M2 | SYSTOLIC BLOOD PRESSURE: 124 MMHG | HEIGHT: 64 IN

## 2018-10-30 DIAGNOSIS — R41.3 MEMORY LOSS: ICD-10-CM

## 2018-10-30 DIAGNOSIS — R56.9 NEW ONSET SEIZURE (HCC): Primary | ICD-10-CM

## 2018-10-30 NOTE — PATIENT INSTRUCTIONS
PRESCRIPTION REFILL POLICY [facility] Statement to Patients April 1, 2014 In an effort to ensure the large volume of patient prescription refills is processed in the most efficient and expeditious manner, we are asking our patients to assist us by calling your Pharmacy for all prescription refills, this will include also your  Mail Order Pharmacy. The pharmacy will contact our office electronically to continue the refill process. Please do not wait until the last minute to call your pharmacy. We need at least 48 hours (2days) to fill prescriptions. We also encourage you to call your pharmacy before going to  your prescription to make sure it is ready. With regard to controlled substance prescription refill requests (narcotic refills) that need to be picked up at our office, we ask your cooperation by providing us with at least 72 hours (3days) notice that you will need a refill. We will not refill narcotic prescription refill requests after 4:00pm on any weekday, Monday through Thursday, or after 2:00pm on Fridays, or on the weekends. We encourage everyone to explore another way of getting your prescription refill request processed using [facility], our patient web portal through our electronic medical record system. [facility] is an efficient and effective way to communicate your medication request directly to the office and  downloadable as an juliette on your smart phone . [facility] also features a review functionality that allows you to view your medication list as well as leave messages for your physician. Are you ready to get connected? If so please review the attatched instructions or speak to any of our staff to get you set up right away! Thank you so much for your cooperation. Should you have any questions please contact our Practice Administrator. The Physicians and Staff,  [facility] Patient Instruction Plan/ Result Policy If we have ordered testing for you, know that; \"NO NEWS IS GOOD NEWS! \" It is our policy that we know longer call patients with results, nor do we  give test results over the phone. We schedule follow up appointments so that your results can be discussed in person. This allows you to address any questions you have regarding the results. If something of concern is revealed on your test, we will contact you to discuss the matter and if needed schedule a sooner follow up appointment. Additionally, results may be found by using the My Chart feature and one of our patient service representatives at the  can give you instructions on how to access this feature to utilize our electronic medical record system. Thank you for your understanding.
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
## 2018-11-13 ENCOUNTER — TELEPHONE (OUTPATIENT)
Dept: NEUROLOGY | Age: 53
End: 2018-11-13

## 2018-11-13 NOTE — TELEPHONE ENCOUNTER
----- Message from [facility] sent at 11/13/2018 12:23 PM EST -----  Regarding: Dr. Flor Cole  Pt's nurse(Radha Pulido) at [facility] stated pt had 2 seizures this wk, and requested an urgent appt. Best contact number [phone].

## 2018-11-14 NOTE — TELEPHONE ENCOUNTER
Patient has been at several different hospitals lately and is at the [facility] crisis home to try to stabilize the patient due to seizure. They are asking for a emergency  Visit with you as a way for you to review his medications and to see if anything else is needed please advise. It looks like we have a 2:40 tomorrow.

## 2018-11-15 ENCOUNTER — TELEPHONE (OUTPATIENT)
Dept: NEUROLOGY | Age: 53
End: 2018-11-15

## 2018-11-15 NOTE — TELEPHONE ENCOUNTER
Marta Lin was calling to advise that a new order is needed for pt to have MRI with anesthesia. Appt on Monday will need to be r/s.

## 2018-11-15 NOTE — TELEPHONE ENCOUNTER
Called and appt. Set up with Paco on 11/20/18.  At 3:00 instructed that patient arrive 15 minutes early for the check-in process

## 2018-11-19 ENCOUNTER — DOCUMENTATION ONLY (OUTPATIENT)
Dept: FAMILY MEDICINE CLINIC | Age: 53
End: 2018-11-19

## 2018-11-27 DIAGNOSIS — R56.9 SEIZURE (HCC): Primary | ICD-10-CM

## 2018-11-29 NOTE — TELEPHONE ENCOUNTER
Order placed for MRI of the brain, imaging, per Verbal Order from Dr. Mary Werner on 11/29/2018 due to seizure.

## 2019-01-01 ENCOUNTER — TELEPHONE (OUTPATIENT)
Dept: FAMILY MEDICINE CLINIC | Age: 54
End: 2019-01-01

## 2019-01-01 ENCOUNTER — DOCUMENTATION ONLY (OUTPATIENT)
Dept: FAMILY MEDICINE CLINIC | Age: 54
End: 2019-01-01

## 2019-01-01 ENCOUNTER — HOSPITAL ENCOUNTER (OUTPATIENT)
Dept: LAB | Age: 54
Discharge: HOME | End: 2019-09-19
Payer: MEDICARE

## 2019-01-01 ENCOUNTER — HOSPITAL ENCOUNTER (OUTPATIENT)
Dept: MAMMOGRAPHY | Age: 54
Discharge: HOME | End: 2019-12-04
Attending: NURSE PRACTITIONER
Payer: MEDICARE

## 2019-01-01 ENCOUNTER — OFFICE VISIT (OUTPATIENT)
Dept: ENDOCRINOLOGY | Age: 54
End: 2019-01-01

## 2019-01-01 ENCOUNTER — TELEPHONE (OUTPATIENT)
Dept: NEUROLOGY | Age: 54
End: 2019-01-01

## 2019-01-01 ENCOUNTER — OFFICE VISIT (OUTPATIENT)
Dept: NEUROLOGY | Age: 54
End: 2019-01-01

## 2019-01-01 VITALS
OXYGEN SATURATION: 97 % | BODY MASS INDEX: 32.78 KG/M2 | HEART RATE: 75 BPM | RESPIRATION RATE: 14 BRPM | DIASTOLIC BLOOD PRESSURE: 70 MMHG | HEIGHT: 64 IN | TEMPERATURE: 97.5 F | SYSTOLIC BLOOD PRESSURE: 134 MMHG | WEIGHT: 192 LBS

## 2019-01-01 VITALS
WEIGHT: 192 LBS | BODY MASS INDEX: 32.78 KG/M2 | HEIGHT: 64 IN | SYSTOLIC BLOOD PRESSURE: 102 MMHG | DIASTOLIC BLOOD PRESSURE: 74 MMHG

## 2019-01-01 DIAGNOSIS — F25.9 SCHIZOAFFECTIVE DISORDER, UNSPECIFIED TYPE (HCC): ICD-10-CM

## 2019-01-01 DIAGNOSIS — E05.90 HYPERTHYROIDISM: Primary | ICD-10-CM

## 2019-01-01 DIAGNOSIS — R56.9 SEIZURE (HCC): ICD-10-CM

## 2019-01-01 DIAGNOSIS — E05.90 HYPERTHYROIDISM: ICD-10-CM

## 2019-01-01 DIAGNOSIS — M81.6 LOCALIZED OSTEOPOROSIS (LEQUESNE): ICD-10-CM

## 2019-01-01 DIAGNOSIS — R25.1 TREMORS OF NERVOUS SYSTEM: ICD-10-CM

## 2019-01-01 DIAGNOSIS — K21.9 GASTROESOPHAGEAL REFLUX DISEASE WITHOUT ESOPHAGITIS: ICD-10-CM

## 2019-01-01 LAB
T4 FREE SERPL-MCNC: 0.89 NG/DL (ref 0.82–1.77)
TSH SERPL DL<=0.005 MIU/L-ACNC: 18.61 UIU/ML (ref 0.45–4.5)

## 2019-01-01 PROCEDURE — 84443 ASSAY THYROID STIM HORMONE: CPT

## 2019-01-01 PROCEDURE — 36415 COLL VENOUS BLD VENIPUNCTURE: CPT

## 2019-01-01 PROCEDURE — 84439 ASSAY OF FREE THYROXINE: CPT

## 2019-01-01 PROCEDURE — 77080 DXA BONE DENSITY AXIAL: CPT

## 2019-01-01 RX ORDER — LACOSAMIDE 100 MG/1
100 TABLET ORAL 2 TIMES DAILY
Qty: 60 TAB | Refills: 5 | Status: SHIPPED | OUTPATIENT
Start: 2019-01-01 | End: 2020-01-01 | Stop reason: SDUPTHER

## 2019-01-01 RX ORDER — SUCRALFATE 1 G/1
TABLET ORAL
Qty: 93 TAB | Status: SHIPPED | OUTPATIENT
Start: 2019-01-01

## 2019-01-01 RX ORDER — METHIMAZOLE 5 MG/1
5 TABLET ORAL DAILY
Qty: 30 TAB | Refills: 11 | Status: SHIPPED | OUTPATIENT
Start: 2019-01-01

## 2019-01-01 RX ORDER — LIDOCAINE AND PRILOCAINE 25; 25 MG/G; MG/G
CREAM TOPICAL AS NEEDED
COMMUNITY

## 2019-01-01 RX ORDER — DOCUSATE SODIUM 100 MG/1
CAPSULE, LIQUID FILLED ORAL
Qty: 31 CAP | Status: SHIPPED | OUTPATIENT
Start: 2019-01-01

## 2019-01-01 RX ORDER — LACOSAMIDE 100 MG/1
100 TABLET ORAL 2 TIMES DAILY
COMMUNITY
End: 2019-01-01 | Stop reason: SDUPTHER

## 2019-01-01 RX ORDER — DIVALPROEX SODIUM 500 MG/1
500 TABLET, DELAYED RELEASE ORAL 3 TIMES DAILY
Qty: 90 TAB | Refills: 5 | Status: SHIPPED | OUTPATIENT
Start: 2019-01-01 | End: 2019-01-01

## 2019-01-01 RX ORDER — DIVALPROEX SODIUM 500 MG/1
TABLET, EXTENDED RELEASE ORAL
Qty: 90 TAB | Refills: 5 | Status: SHIPPED | OUTPATIENT
Start: 2019-01-01 | End: 2020-01-01 | Stop reason: SDUPTHER

## 2019-01-01 RX ORDER — SUCRALFATE 1 G/1
TABLET ORAL
Qty: 90 TAB | Refills: 2 | Status: SHIPPED | OUTPATIENT
Start: 2019-01-01

## 2019-01-07 ENCOUNTER — TELEPHONE (OUTPATIENT)
Dept: FAMILY MEDICINE CLINIC | Age: 54
End: 2019-01-07

## 2019-01-07 NOTE — TELEPHONE ENCOUNTER
Heike/Medical Coordinator from [address] [place] [address],  would like to increase his Lorazepam due to behavior difficulties at [facility] [place], [facility] [phone].  Heike's # is [phone]

## 2019-01-07 NOTE — TELEPHONE ENCOUNTER
LVM to [facility] that psych will have to be the one to adjust dose of lorazepam.

## 2019-01-22 ENCOUNTER — OFFICE VISIT (OUTPATIENT)
Dept: NEUROLOGY | Age: 54
End: 2019-01-22

## 2019-01-22 DIAGNOSIS — R56.9 SEIZURE (HCC): Primary | ICD-10-CM

## 2019-01-22 RX ORDER — LACOSAMIDE 100 MG/1
100 TABLET ORAL 2 TIMES DAILY
COMMUNITY
End: 2019-01-24 | Stop reason: SDUPTHER

## 2019-01-22 RX ORDER — DIVALPROEX SODIUM 500 MG/1
500 TABLET, DELAYED RELEASE ORAL 3 TIMES DAILY
COMMUNITY
End: 2019-01-01 | Stop reason: SDUPTHER

## 2019-01-22 NOTE — LETTER
Neurology Progress Note Patient ID: 
Michael Pi [id number] 
47 y.o. 
5/20/1964 HISTORY PROVIDED BY: 
Patient Other: two caregivers Chief Complaint: seizures Subjective:  
 Mr. Albino Gold is here for follow up today of seizures. He has schizophrenia and significant behavior issues. Today his caregivers say that he is having major behavioral issues. He is in a new group home and has been there for 1 month. He was scheduled for an MRI/EEG at last visit but this was not done. He is off Keppra but the current caregivers are not aware of the switch to depakote and vimpat. He has not had any seizures that they are aware of. Current AEDs: 
Depakote DR 500mg TID Vimpat 100mg BID He also follows with psychiatry. He is on sertraline and seroquel. They feel like this needs to be adjusted further. Patient is acting out and aggressive. Objective:  
ROS: 
Per HPI- 
Otherwise 12 point ROS was negative Meds: 
Current Outpatient Medications on File Prior to Visit Medication Sig Dispense Refill  clobetasol (CLOBEX) 0.05 % lotion APPLY TO AFFECTED AREA 2 TIMES A DAY AS DIRECTED. 50 mL 0  
 QUEtiapine (SEROQUEL) 200 mg tablet Take 1 Tab by mouth nightly. 30 Tab 2  
 sertraline (ZOLOFT) 100 mg tablet Take 1 Tab by mouth daily. In the morning 30 Tab 2  
 levETIRAcetam (KEPPRA) 500 mg tablet Take 1 Tab by mouth two (2) times a day. 60 Tab 2  
 sucralfate (CARAFATE) 1 gram tablet TAKE (1) TABLET BY MOUTH THREE TIMES DAILY prn 90 Tab 2  
 omeprazole (PRILOSEC) 20 mg capsule TAKE ONE CAPSULE BY MOUTH DAILY 30 Cap 5  
 LORazepam (ATIVAN) 1 mg tablet Take 1 Tab by mouth every six (6) hours as needed for Anxiety. Max Daily Amount: 4 mg. 120 Tab 0  
 polyethylene glycol (MIRALAX) 17 gram packet Take 1 Packet by mouth every Monday, Wednesday, Friday.  12 Each 11  
 OTHER Chopped diet, dx: J69.0 1 Each 0  
 amLODIPine-benazepril (LOTREL) 10-20 mg per capsule TAKE ONE CAPSULE BY MOUTH DAILY 30 Cap 5  
 multivitamin, tx-iron-ca-min (THERAPY M) 9 mg iron-400 mcg tab tablet TAKE ONE TABLET BY MOUTH DAILY. 30 Tab 11  
 aspirin delayed-release 81 mg tablet TAKE ONE TABLET BY MOUTH DAILY. 30 Tab 11  
 OTHER Attends extra large pull-up, use as needed. Dx: 318.0 150 Each 11 No current facility-administered medications on file prior to visit. Imaging: No new imaging Reviewed records in [facility] and media tab today Lab Review Results for orders placed or performed in visit on 03/28/18 CBC WITH AUTOMATED DIFF Result Value Ref Range WBC 6.9 3.4 - 10.8 x10E3/uL  
 RBC 3.99 (L) 4.14 - 5.80 x10E6/uL HGB 11.9 (L) 13.0 - 17.7 g/dL HCT 36.7 (L) 37.5 - 51.0 % MCV 92 79 - 97 fL  
 MCH 29.8 26.6 - 33.0 pg  
 MCHC 32.4 31.5 - 35.7 g/dL  
 RDW 14.9 12.3 - 15.4 % PLATELET 126 700 - 927 x10E3/uL NEUTROPHILS 47 Not Estab. % Lymphocytes 36 Not Estab. % MONOCYTES 10 Not Estab. % EOSINOPHILS 5 Not Estab. % BASOPHILS 2 Not Estab. %  
 ABS. NEUTROPHILS 3.3 1.4 - 7.0 x10E3/uL Abs Lymphocytes 2.5 0.7 - 3.1 x10E3/uL  
 ABS. MONOCYTES 0.7 0.1 - 0.9 x10E3/uL  
 ABS. EOSINOPHILS 0.4 0.0 - 0.4 x10E3/uL  
 ABS. BASOPHILS 0.1 0.0 - 0.2 x10E3/uL IMMATURE GRANULOCYTES 0 Not Estab. %  
 ABS. IMM. GRANS. 0.0 0.0 - 0.1 x10E3/uL TSH 3RD GENERATION Result Value Ref Range TSH 3.680 0.450 - 4.500 uIU/mL METABOLIC PANEL, COMPREHENSIVE Result Value Ref Range Glucose 92 65 - 99 mg/dL BUN 14 6 - 24 mg/dL Creatinine 1.06 0.76 - 1.27 mg/dL GFR est non-AA 80 >59 mL/min/1.73 GFR est AA 92 >59 mL/min/1.73  
 BUN/Creatinine ratio 13 9 - 20 Sodium 140 134 - 144 mmol/L Potassium 4.2 3.5 - 5.2 mmol/L Chloride 102 96 - 106 mmol/L  
 CO2 23 18 - 29 mmol/L Calcium 9.8 8.7 - 10.2 mg/dL Protein, total 7.6 6.0 - 8.5 g/dL Albumin 4.4 3.5 - 5.5 g/dL GLOBULIN, TOTAL 3.2 1.5 - 4.5 g/dL A-G Ratio 1.4 1.2 - 2.2 Bilirubin, total <0.2 0.0 - 1.2 mg/dL Alk. phosphatase 61 39 - 117 IU/L  
 AST (SGOT) 26 0 - 40 IU/L  
 ALT (SGPT) 21 0 - 44 IU/L  
LITHIUM Result Value Ref Range Lithium level 1.0 0.6 - 1.2 mmol/L  
VALPROIC ACID Result Value Ref Range Valproic acid 67 50 - 100 ug/mL Exam: Unable to take vitals as patient was agitated and cussing Gen: Well developed CV: RRR Lungs: non labored breathing Abd: non distending Neuro: A&O x 3, no dysarthria or aphasia CN II-XII: PERRL, EOMI, face symmetric, tongue/palate midline Motor: strength 5/5 all four ext Sensory: intact to LT Gait: Wide-based Assessment:  
Aneudy Jorgensen is a 47 y.o. male who presents for follow up of seizures. He does have a history of schizophrenia and behavioral issues related to this. At his previous appointment they are also concerned about possible anoxic injury from 1 of his seizures. Patient is new caregivers today so it is difficult to obtain any additional history. MRI and EEG were not completed. Will reorder these today. Patient was changed from [facility] to [address] [place] [address]. We have no records stating why this occurred and the current group home does not know. We will continue Vimpat at current dose Plan:  
 
Visit Diagnoses · Seizure [facility])    -  Primary · Relevant Medications · lacosamide (VIMPAT) 100 mg tab tabletcontinue twice a day · divalproex DR (DEPAKOTE) 500 mg tablet · Other Relevant Orders · EEG  
 · MRI BRAIN W WO CONT with anesthesia · Recommend follow-up with psychiatry for behavioral issues Follow-up in 3 months Signed: 
Barbie Membreno MD 
1/22/2019 Medications and side effects discussed with patient in detail. With any new medications prescribed, patient was given instructions on administration and side effects. Written medication information was provided to the patient as well. This note was created using voice recognition software.  Despite editing, there may be syntax errors. This note will not be viewable in [address]

## 2019-01-22 NOTE — PROGRESS NOTES
Neurology Progress Note Patient ID: 
Kyleigh Gomez [id number] 
47 y.o. 
5/20/1964 HISTORY PROVIDED BY: 
Patient Other: two caregivers Chief Complaint: seizures Subjective:  
 Mr. Gómez Riggs is here for follow up today of seizures. He has schizophrenia and significant behavior issues. Today his caregivers say that he is having major behavioral issues. He is in a new group home and has been there for 1 month. He was scheduled for an MRI/EEG at last visit but this was not done. He is off Keppra but the current caregivers are not aware of the switch to depakote and vimpat. He has not had any seizures that they are aware of. Current AEDs: 
Depakote DR 500mg TID Vimpat 100mg BID He also follows with psychiatry. He is on sertraline and seroquel. They feel like this needs to be adjusted further. Patient is acting out and aggressive. Objective:  
ROS: 
Per HPI- 
Otherwise 12 point ROS was negative Meds: 
Current Outpatient Medications on File Prior to Visit Medication Sig Dispense Refill  clobetasol (CLOBEX) 0.05 % lotion APPLY TO AFFECTED AREA 2 TIMES A DAY AS DIRECTED. 50 mL 0  
 QUEtiapine (SEROQUEL) 200 mg tablet Take 1 Tab by mouth nightly. 30 Tab 2  
 sertraline (ZOLOFT) 100 mg tablet Take 1 Tab by mouth daily. In the morning 30 Tab 2  
 levETIRAcetam (KEPPRA) 500 mg tablet Take 1 Tab by mouth two (2) times a day. 60 Tab 2  
 sucralfate (CARAFATE) 1 gram tablet TAKE (1) TABLET BY MOUTH THREE TIMES DAILY prn 90 Tab 2  
 omeprazole (PRILOSEC) 20 mg capsule TAKE ONE CAPSULE BY MOUTH DAILY 30 Cap 5  
 LORazepam (ATIVAN) 1 mg tablet Take 1 Tab by mouth every six (6) hours as needed for Anxiety. Max Daily Amount: 4 mg. 120 Tab 0  
 polyethylene glycol (MIRALAX) 17 gram packet Take 1 Packet by mouth every Monday, Wednesday, Friday.  12 Each 11  
 OTHER Chopped diet, dx: J69.0 1 Each 0  
 amLODIPine-benazepril (LOTREL) 10-20 mg per capsule TAKE ONE CAPSULE BY MOUTH DAILY 30 Cap 5  
 multivitamin, tx-iron-ca-min (THERAPY M) 9 mg iron-400 mcg tab tablet TAKE ONE TABLET BY MOUTH DAILY. 30 Tab 11  
 aspirin delayed-release 81 mg tablet TAKE ONE TABLET BY MOUTH DAILY. 30 Tab 11  
 OTHER Attends extra large pull-up, use as needed. Dx: 318.0 150 Each 11 No current facility-administered medications on file prior to visit. Imaging: No new imaging Reviewed records in [facility] and media tab today Lab Review Results for orders placed or performed in visit on 03/28/18 CBC WITH AUTOMATED DIFF Result Value Ref Range WBC 6.9 3.4 - 10.8 x10E3/uL  
 RBC 3.99 (L) 4.14 - 5.80 x10E6/uL HGB 11.9 (L) 13.0 - 17.7 g/dL HCT 36.7 (L) 37.5 - 51.0 % MCV 92 79 - 97 fL  
 MCH 29.8 26.6 - 33.0 pg  
 MCHC 32.4 31.5 - 35.7 g/dL  
 RDW 14.9 12.3 - 15.4 % PLATELET 132 578 - 557 x10E3/uL NEUTROPHILS 47 Not Estab. % Lymphocytes 36 Not Estab. % MONOCYTES 10 Not Estab. % EOSINOPHILS 5 Not Estab. % BASOPHILS 2 Not Estab. %  
 ABS. NEUTROPHILS 3.3 1.4 - 7.0 x10E3/uL Abs Lymphocytes 2.5 0.7 - 3.1 x10E3/uL  
 ABS. MONOCYTES 0.7 0.1 - 0.9 x10E3/uL  
 ABS. EOSINOPHILS 0.4 0.0 - 0.4 x10E3/uL  
 ABS. BASOPHILS 0.1 0.0 - 0.2 x10E3/uL IMMATURE GRANULOCYTES 0 Not Estab. %  
 ABS. IMM. GRANS. 0.0 0.0 - 0.1 x10E3/uL TSH 3RD GENERATION Result Value Ref Range TSH 3.680 0.450 - 4.500 uIU/mL METABOLIC PANEL, COMPREHENSIVE Result Value Ref Range Glucose 92 65 - 99 mg/dL BUN 14 6 - 24 mg/dL Creatinine 1.06 0.76 - 1.27 mg/dL GFR est non-AA 80 >59 mL/min/1.73 GFR est AA 92 >59 mL/min/1.73  
 BUN/Creatinine ratio 13 9 - 20 Sodium 140 134 - 144 mmol/L Potassium 4.2 3.5 - 5.2 mmol/L Chloride 102 96 - 106 mmol/L  
 CO2 23 18 - 29 mmol/L Calcium 9.8 8.7 - 10.2 mg/dL Protein, total 7.6 6.0 - 8.5 g/dL Albumin 4.4 3.5 - 5.5 g/dL GLOBULIN, TOTAL 3.2 1.5 - 4.5 g/dL A-G Ratio 1.4 1.2 - 2.2 Bilirubin, total <0.2 0.0 - 1.2 mg/dL Alk. phosphatase 61 39 - 117 IU/L  
 AST (SGOT) 26 0 - 40 IU/L  
 ALT (SGPT) 21 0 - 44 IU/L  
LITHIUM Result Value Ref Range Lithium level 1.0 0.6 - 1.2 mmol/L  
VALPROIC ACID Result Value Ref Range Valproic acid 67 50 - 100 ug/mL Exam: Unable to take vitals as patient was agitated and cussing Gen: Well developed CV: RRR Lungs: non labored breathing Abd: non distending Neuro: A&O x 3, no dysarthria or aphasia CN II-XII: PERRL, EOMI, face symmetric, tongue/palate midline Motor: strength 5/5 all four ext Sensory: intact to LT Gait: Wide-based Assessment:  
Maxime Barnett is a 47 y.o. male who presents for follow up of seizures. He does have a history of schizophrenia and behavioral issues related to this. At his previous appointment they are also concerned about possible anoxic injury from 1 of his seizures. Patient is new caregivers today so it is difficult to obtain any additional history. MRI and EEG were not completed. Will reorder these today. Patient was changed from [address] to [address]. We have no records stating why this occurred and the current group home does not know. We will continue Vimpat at current dose Plan:  
 
Visit Diagnoses · Seizure [facility])    -  Primary · Relevant Medications · lacosamide (VIMPAT) 100 mg tab tabletcontinue twice a day · divalproex DR (DEPAKOTE) 500 mg tablet · Other Relevant Orders · EEG  
 · MRI BRAIN W WO CONT with anesthesia · Recommend follow-up with psychiatry for behavioral issues Follow-up in 3 months Signed: 
Hannah Dennis MD 
1/22/2019 Medications and side effects discussed with patient in detail. With any new medications prescribed, patient was given instructions on administration and side effects. Written medication information was provided to the patient as well. This note was created using voice recognition software.  Despite editing, there may be syntax errors. This note will not be viewable in [address]

## 2019-01-22 NOTE — PATIENT INSTRUCTIONS
Patient Instruction Plan/ Result Policy If we have ordered testing for you, know that; \"NO NEWS IS GOOD NEWS! \" It is our policy that we know longer call patients with results, nor do we  give test results over the phone. We schedule follow up appointments so that your results can be discussed in person. This allows you to address any questions you have regarding the results. If something of concern is revealed on your test, we will contact you to discuss the matter and if needed schedule a sooner follow up appointment. Additionally, results may be found by using the My Chart feature and one of our patient service representatives at the  can give you instructions on how to access this feature to utilize our electronic medical record system. Thank you for your understanding. PRESCRIPTION REFILL POLICY [facility] Statement to Patients April 1, 2014 In an effort to ensure the large volume of patient prescription refills is processed in the most efficient and expeditious manner, we are asking our patients to assist us by calling your Pharmacy for all prescription refills, this will include also your  Mail Order Pharmacy. The pharmacy will contact our office electronically to continue the refill process. Please do not wait until the last minute to call your pharmacy. We need at least 48 hours (2days) to fill prescriptions. We also encourage you to call your pharmacy before going to  your prescription to make sure it is ready. With regard to controlled substance prescription refill requests (narcotic refills) that need to be picked up at our office, we ask your cooperation by providing us with at least 72 hours (3days) notice that you will need a refill. We will not refill narcotic prescription refill requests after 4:00pm on any weekday, Monday through Thursday, or after 2:00pm on Fridays, or on the weekends. We encourage everyone to explore another way of getting your prescription refill request processed using [employer], our patient web portal through our electronic medical record system. [employer] is an efficient and effective way to communicate your medication request directly to the office and  downloadable as an juliette on your smart phone . [employer] also features a review functionality that allows you to view your medication list as well as leave messages for your physician. Are you ready to get connected? If so please review the attatched instructions or speak to any of our staff to get you set up right away! Thank you so much for your cooperation. Should you have any questions please contact our Practice Administrator. The Physicians and Staff,  [facility]

## 2019-01-23 ENCOUNTER — OFFICE VISIT (OUTPATIENT)
Dept: FAMILY MEDICINE CLINIC | Age: 54
End: 2019-01-23

## 2019-01-23 ENCOUNTER — HOSPITAL ENCOUNTER (OUTPATIENT)
Dept: LAB | Age: 54
Discharge: HOME | End: 2019-01-23
Payer: MEDICARE

## 2019-01-23 VITALS
HEART RATE: 72 BPM | OXYGEN SATURATION: 96 % | TEMPERATURE: 98.4 F | BODY MASS INDEX: 32.61 KG/M2 | SYSTOLIC BLOOD PRESSURE: 109 MMHG | HEIGHT: 64 IN | WEIGHT: 191 LBS | RESPIRATION RATE: 14 BRPM | DIASTOLIC BLOOD PRESSURE: 68 MMHG

## 2019-01-23 DIAGNOSIS — F79 MR (MENTAL RETARDATION): ICD-10-CM

## 2019-01-23 DIAGNOSIS — F39 MOOD DISORDER (HCC): ICD-10-CM

## 2019-01-23 DIAGNOSIS — F41.1 ANXIETY STATE: ICD-10-CM

## 2019-01-23 DIAGNOSIS — I10 ESSENTIAL HYPERTENSION: Primary | ICD-10-CM

## 2019-01-23 PROCEDURE — 80164 ASSAY DIPROPYLACETIC ACD TOT: CPT

## 2019-01-23 PROCEDURE — 85025 COMPLETE CBC W/AUTO DIFF WBC: CPT

## 2019-01-23 PROCEDURE — 84443 ASSAY THYROID STIM HORMONE: CPT

## 2019-01-23 PROCEDURE — 80053 COMPREHEN METABOLIC PANEL: CPT

## 2019-01-23 RX ORDER — PANTOPRAZOLE SODIUM 40 MG/1
40 TABLET, DELAYED RELEASE ORAL DAILY
COMMUNITY
End: 2019-02-26 | Stop reason: SDUPTHER

## 2019-01-23 NOTE — PROGRESS NOTES
Chief Complaint   Patient presents with    Behavioral Problem     Combative, verbal abuse, refuses meds    Sleep Problem     1. Have you been to the ER, urgent care clinic since your last visit? Hospitalized since your last visit? No    2. Have you seen or consulted any other health care providers outside of the [address] [place] [address] since your last visit? Include any pap smears or colon screening. No        Chief Complaint   Patient presents with    Behavioral Problem     Combative, verbal abuse, refuses meds    Sleep Problem     He is a 47 y.o. male who presents for evalution. Reviewed PmHx, RxHx, FmHx, SocHx, AllgHx and updated and dated in the chart. Patient Active Problem List    Diagnosis    Mood disorder ([facility]    Severe obesity (BMI 35.0-39. 9) with comorbidity ([facility]    Essential hypertension    Hyperlipidemia    Constipation    Anxiety state    MR (mental retardation)       Review of Systems - negative except as listed above in the HPI    Objective:     Vitals:    01/23/19 1126   BP: 109/68   Pulse: 72   Resp: 14   Temp: 98.4 °F (36.9 °C)   TempSrc: Oral   SpO2: 96%   Weight: 191 lb (86.6 kg)   Height: 5' 4\" (1.626 m)     Physical Examination: General appearance - alert, well appearing, and in no distress  Chest - clear to auscultation, no wheezes, rales or rhonchi, symmetric air entry  Heart - normal rate, regular rhythm, normal S1, S2, no murmurs, rubs, clicks or gallops      Assessment/ Plan:   Diagnoses and all orders for this visit:    1. Essential hypertension  -     METABOLIC PANEL, COMPREHENSIVE  -at goal    2. MR (mental retardation)  -at [facility]    3. Anxiety state  -refer back to psych    4. Mood disorder (HCC)  -     VALPROIC ACID  -     CBC WITH AUTOMATED DIFF  -     TSH 3RD GENERATION       Follow-up Disposition:  Return if symptoms worsen or fail to improve. I have discussed the diagnosis with the patient and the intended plan as seen in the above orders.   The patient understands and agrees with the plan. The patient has received an after-visit summary and questions were answered concerning future plans. Medication Side Effects and Warnings were discussed with patient  Patient Labs were reviewed and or requested:  Patient Past Records were reviewed and or requested    Henry Rose M.D. There are no Patient Instructions on file for this visit.

## 2019-01-24 DIAGNOSIS — R56.9 SEIZURE (HCC): Primary | ICD-10-CM

## 2019-01-24 LAB
ALBUMIN SERPL-MCNC: 3.7 G/DL (ref 3.5–5.5)
ALBUMIN/GLOB SERPL: 1.2 {RATIO} (ref 1.2–2.2)
ALP SERPL-CCNC: 64 IU/L (ref 39–117)
ALT SERPL-CCNC: 18 IU/L (ref 0–44)
AST SERPL-CCNC: 14 IU/L (ref 0–40)
BASOPHILS # BLD AUTO: 0 X10E3/UL (ref 0–0.2)
BASOPHILS NFR BLD AUTO: 0 %
BILIRUB SERPL-MCNC: 0.2 MG/DL (ref 0–1.2)
BUN SERPL-MCNC: 12 MG/DL (ref 6–24)
BUN/CREAT SERPL: 18 (ref 9–20)
CALCIUM SERPL-MCNC: 9.9 MG/DL (ref 8.7–10.2)
CHLORIDE SERPL-SCNC: 109 MMOL/L (ref 96–106)
CO2 SERPL-SCNC: 24 MMOL/L (ref 20–29)
CREAT SERPL-MCNC: 0.65 MG/DL (ref 0.76–1.27)
EOSINOPHIL # BLD AUTO: 0.2 X10E3/UL (ref 0–0.4)
EOSINOPHIL NFR BLD AUTO: 3 %
ERYTHROCYTE [DISTWIDTH] IN BLOOD BY AUTOMATED COUNT: 17 % (ref 12.3–15.4)
GLOBULIN SER CALC-MCNC: 3.1 G/DL (ref 1.5–4.5)
GLUCOSE SERPL-MCNC: 102 MG/DL (ref 65–99)
HCT VFR BLD AUTO: 35.2 % (ref 37.5–51)
HGB BLD-MCNC: 11.5 G/DL (ref 13–17.7)
IMM GRANULOCYTES # BLD AUTO: 0 X10E3/UL (ref 0–0.1)
IMM GRANULOCYTES NFR BLD AUTO: 0 %
LYMPHOCYTES # BLD AUTO: 3.3 X10E3/UL (ref 0.7–3.1)
LYMPHOCYTES NFR BLD AUTO: 48 %
MCH RBC QN AUTO: 29 PG (ref 26.6–33)
MCHC RBC AUTO-ENTMCNC: 32.7 G/DL (ref 31.5–35.7)
MCV RBC AUTO: 89 FL (ref 79–97)
MONOCYTES # BLD AUTO: 0.9 X10E3/UL (ref 0.1–0.9)
MONOCYTES NFR BLD AUTO: 13 %
NEUTROPHILS # BLD AUTO: 2.6 X10E3/UL (ref 1.4–7)
NEUTROPHILS NFR BLD AUTO: 36 %
PLATELET # BLD AUTO: 301 X10E3/UL (ref 150–379)
POTASSIUM SERPL-SCNC: 4.2 MMOL/L (ref 3.5–5.2)
PROT SERPL-MCNC: 6.8 G/DL (ref 6–8.5)
RBC # BLD AUTO: 3.96 X10E6/UL (ref 4.14–5.8)
SODIUM SERPL-SCNC: 147 MMOL/L (ref 134–144)
TSH SERPL DL<=0.005 MIU/L-ACNC: <0.006 UIU/ML (ref 0.45–4.5)
VALPROATE SERPL-MCNC: 94 UG/ML (ref 50–100)
WBC # BLD AUTO: 7 X10E3/UL (ref 3.4–10.8)

## 2019-01-24 RX ORDER — LACOSAMIDE 100 MG/1
100 TABLET ORAL 2 TIMES DAILY
Qty: 60 TAB | Refills: 5 | Status: SHIPPED | OUTPATIENT
Start: 2019-01-24 | End: 2019-01-28 | Stop reason: SDUPTHER

## 2019-01-25 ENCOUNTER — TELEPHONE (OUTPATIENT)
Dept: NEUROLOGY | Age: 54
End: 2019-01-25

## 2019-01-25 NOTE — TELEPHONE ENCOUNTER
----- Message from Juliette Gaston sent at 1/25/2019 11:48 AM EST -----  Regarding: Maykel/Xiang Palacio called from [facility] requesting a refill on the medication vimpat. Best contact number is [phone] .

## 2019-01-28 DIAGNOSIS — R56.9 SEIZURE (HCC): ICD-10-CM

## 2019-01-28 RX ORDER — LACOSAMIDE 100 MG/1
100 TABLET ORAL 2 TIMES DAILY
Qty: 60 TAB | Refills: 5 | Status: SHIPPED | OUTPATIENT
Start: 2019-01-28 | End: 2019-06-05 | Stop reason: SDUPTHER

## 2019-01-28 NOTE — PROGRESS NOTES
Called and spoke to patient's . Yony Shetty on HIPAA) Rick Wheatley states understanding of lab results per Dr Vo:TSH is off, refer to Endocrine: Dr Poli Nickerson contact info provided.

## 2019-01-29 ENCOUNTER — TELEPHONE (OUTPATIENT)
Dept: NEUROLOGY | Age: 54
End: 2019-01-29

## 2019-01-29 NOTE — TELEPHONE ENCOUNTER
----- Message from [facility] sent at 1/29/2019 12:57 PM EST -----  Regarding: Dr. Violet Jacome, from [facility] [phone], is checking on the status of a medication refill for \"Vimpat\"? Multiple faxes and phone calls.

## 2019-01-30 ENCOUNTER — TELEPHONE (OUTPATIENT)
Dept: NEUROLOGY | Age: 54
End: 2019-01-30

## 2019-01-30 NOTE — TELEPHONE ENCOUNTER
Called and spoke with Teresa Leone from [address] [place] [address]. Explained that I sent this script in on 1/24/19 and it looks like Last Munoz sent it in on 1/28/19. I asked if I could please give a verbal. He stated that I could. Order placed for Vimpat 100 mg BID, PO, per Verbal Order from Dr. Rayo Malhotra on 1/30/2019 due to Seizure.

## 2019-02-05 RX ORDER — POLYETHYLENE GLYCOL 3350 17 G/17G
POWDER, FOR SOLUTION ORAL
Qty: 527 G | Refills: 98 | Status: SHIPPED | OUTPATIENT
Start: 2019-02-05 | End: 2019-06-05 | Stop reason: SDUPTHER

## 2019-02-26 ENCOUNTER — OFFICE VISIT (OUTPATIENT)
Dept: FAMILY MEDICINE CLINIC | Age: 54
End: 2019-02-26

## 2019-02-26 ENCOUNTER — HOSPITAL ENCOUNTER (OUTPATIENT)
Dept: LAB | Age: 54
Discharge: HOME | End: 2019-02-26
Payer: MEDICARE

## 2019-02-26 VITALS
HEART RATE: 93 BPM | DIASTOLIC BLOOD PRESSURE: 87 MMHG | OXYGEN SATURATION: 96 % | HEIGHT: 64 IN | BODY MASS INDEX: 32.1 KG/M2 | RESPIRATION RATE: 18 BRPM | SYSTOLIC BLOOD PRESSURE: 130 MMHG | TEMPERATURE: 97.9 F | WEIGHT: 188 LBS

## 2019-02-26 DIAGNOSIS — Z00.00 ROUTINE GENERAL MEDICAL EXAMINATION AT A HEALTH CARE FACILITY: Primary | ICD-10-CM

## 2019-02-26 DIAGNOSIS — E87.6 HYPOKALEMIA: ICD-10-CM

## 2019-02-26 DIAGNOSIS — Z11.1 SCREENING-PULMONARY TB: ICD-10-CM

## 2019-02-26 DIAGNOSIS — K21.9 GASTROESOPHAGEAL REFLUX DISEASE WITHOUT ESOPHAGITIS: ICD-10-CM

## 2019-02-26 DIAGNOSIS — I10 ESSENTIAL HYPERTENSION: ICD-10-CM

## 2019-02-26 DIAGNOSIS — R56.9 SEIZURE (HCC): ICD-10-CM

## 2019-02-26 PROCEDURE — 80053 COMPREHEN METABOLIC PANEL: CPT

## 2019-02-26 PROCEDURE — 86480 TB TEST CELL IMMUN MEASURE: CPT

## 2019-02-26 RX ORDER — PANTOPRAZOLE SODIUM 40 MG/1
TABLET, DELAYED RELEASE ORAL
Qty: 28 TAB | Status: SHIPPED | OUTPATIENT
Start: 2019-02-26 | End: 2019-06-14

## 2019-02-26 NOTE — PROGRESS NOTES
Patient here for cpe, fasting labs. 1. Have you been to the ER, urgent care clinic since your last visit? Hospitalized since your last visit? No    2. Have you seen or consulted any other health care providers outside of the [facility] since your last visit? Include any pap smears or colon screening. No     Chief Complaint   Patient presents with    Complete Physical   [facility] Follow Up     [facility] ed abdominal pain and low potassium, dehydration     he is a 47y.o. year old male who presents for evalution. Reviewed PmHx, RxHx, FmHx, SocHx, AllgHx and updated and dated in the chart. Patient Active Problem List    Diagnosis    Mood disorder ([facility]    Severe obesity (BMI 35.0-39. 9) with comorbidity ([facility]    Essential hypertension    Hyperlipidemia    Constipation    Anxiety state    MR (mental retardation)       Review of Systems - negative except as listed above in the HPI    Objective:     Vitals:    02/26/19 1039   BP: 130/87   Pulse: 93   Resp: 18   Temp: 97.9 °F (36.6 °C)   SpO2: 96%   Weight: 188 lb (85.3 kg)   Height: 5' 4\" (1.626 m)     Physical Examination: General appearance - alert, well appearing, and in no distress  Eyes - pupils equal and reactive, extraocular eye movements intact  Ears - bilateral TM's and external ear canals normal  Nose - normal and patent, no erythema, discharge or polyps  Mouth - mucous membranes moist, pharynx normal without lesions  Neck - supple, no significant adenopathy  Chest - clear to auscultation, no wheezes, rales or rhonchi, symmetric air entry  Heart - normal rate, regular rhythm, normal S1, S2, no murmurs, rubs, clicks or gallops  Abdomen - soft, nontender, nondistended, no masses or organomegaly    Assessment/ Plan:   Diagnoses and all orders for this visit:    1. Routine general medical examination at a health care facility  -see below    2. Hypokalemia  -     METABOLIC PANEL, COMPREHENSIVE    3.  Essential hypertension  - METABOLIC PANEL, COMPREHENSIVE  At goal    4. Gastroesophageal reflux disease without esophagitis  -     REFERRAL TO GASTROENTEROLOGY    5. Seizure ([facility]  -stable     -Patient is in good health  -Discussed with patient cancer risk factors and screens needed  -Colonoscopy was recommended based on current guidelines for screening.  -Labs from previous visits were discussed with patient yes  -Discussed with patient diet and exercise  -Immunizations appropriate for age were discussed with pt and updated  -Follow-up Disposition:  Return if symptoms worsen or fail to improve. I have discussed the diagnosis with the patient and the intended plan as seen in the above orders. The patient understands and agrees with the plan. The patient has received an after-visit summary and questions were answered concerning future plans. Medication Side Effects and Warnings were discussed with patient  Patient Labs were reviewed and or requested  Patient Past Records were reviewed and or requested     There are no Patient Instructions on file for this visit.         Trevon Dee M.D.

## 2019-02-27 LAB
ALBUMIN SERPL-MCNC: 3.9 G/DL (ref 3.5–5.5)
ALBUMIN/GLOB SERPL: 1.3 {RATIO} (ref 1.2–2.2)
ALP SERPL-CCNC: 67 IU/L (ref 39–117)
ALT SERPL-CCNC: 13 IU/L (ref 0–44)
AST SERPL-CCNC: 15 IU/L (ref 0–40)
BILIRUB SERPL-MCNC: <0.2 MG/DL (ref 0–1.2)
BUN SERPL-MCNC: 9 MG/DL (ref 6–24)
BUN/CREAT SERPL: 15 (ref 9–20)
CALCIUM SERPL-MCNC: 9.8 MG/DL (ref 8.7–10.2)
CHLORIDE SERPL-SCNC: 106 MMOL/L (ref 96–106)
CO2 SERPL-SCNC: 23 MMOL/L (ref 20–29)
CREAT SERPL-MCNC: 0.62 MG/DL (ref 0.76–1.27)
GLOBULIN SER CALC-MCNC: 2.9 G/DL (ref 1.5–4.5)
GLUCOSE SERPL-MCNC: 99 MG/DL (ref 65–99)
POTASSIUM SERPL-SCNC: 4.1 MMOL/L (ref 3.5–5.2)
PROT SERPL-MCNC: 6.8 G/DL (ref 6–8.5)
SODIUM SERPL-SCNC: 142 MMOL/L (ref 134–144)

## 2019-02-27 NOTE — PROGRESS NOTES
After reviewing your labs, I believe they are within normal  limits for your age and let us stay with our current plan of action. If you have any questions, feel free to email thru \[facility]\"" SERVICES, or give us   a call back. Daisy Momin M.D.   Good Help to Those in Need  \"You maybe whatever you resolve to be\"

## 2019-03-01 ENCOUNTER — ANESTHESIA EVENT (OUTPATIENT)
Dept: MRI IMAGING | Age: 54
End: 2019-03-01
Payer: MEDICARE

## 2019-03-01 ENCOUNTER — HOSPITAL ENCOUNTER (OUTPATIENT)
Dept: MRI IMAGING | Age: 54
Discharge: HOME HEALTH | End: 2019-03-01
Attending: PSYCHIATRY & NEUROLOGY
Payer: MEDICARE

## 2019-03-01 ENCOUNTER — ANESTHESIA (OUTPATIENT)
Dept: MRI IMAGING | Age: 54
End: 2019-03-01
Payer: MEDICARE

## 2019-03-01 VITALS
OXYGEN SATURATION: 100 % | TEMPERATURE: 98.4 F | SYSTOLIC BLOOD PRESSURE: 132 MMHG | HEIGHT: 64 IN | BODY MASS INDEX: 31.88 KG/M2 | DIASTOLIC BLOOD PRESSURE: 75 MMHG | WEIGHT: 186.73 LBS | RESPIRATION RATE: 18 BRPM | HEART RATE: 73 BPM

## 2019-03-01 DIAGNOSIS — R56.9 SEIZURE (HCC): ICD-10-CM

## 2019-03-01 PROCEDURE — 70553 MRI BRAIN STEM W/O & W/DYE: CPT

## 2019-03-01 PROCEDURE — 77030020143 HC AIRWY LARYN INTUB CGAS -A: Performed by: NURSE ANESTHETIST, CERTIFIED REGISTERED

## 2019-03-01 PROCEDURE — 76060000032 HC ANESTHESIA 0.5 TO 1 HR

## 2019-03-01 PROCEDURE — 74011250636 HC RX REV CODE- 250/636: Performed by: RADIOLOGY

## 2019-03-01 PROCEDURE — 76210000021 HC REC RM PH II 0.5 TO 1 HR

## 2019-03-01 PROCEDURE — 74011250636 HC RX REV CODE- 250/636

## 2019-03-01 PROCEDURE — 74011250636 HC RX REV CODE- 250/636: Performed by: ANESTHESIOLOGY

## 2019-03-01 PROCEDURE — A9575 INJ GADOTERATE MEGLUMI 0.1ML: HCPCS | Performed by: RADIOLOGY

## 2019-03-01 RX ORDER — SODIUM CHLORIDE, SODIUM LACTATE, POTASSIUM CHLORIDE, CALCIUM CHLORIDE 600; 310; 30; 20 MG/100ML; MG/100ML; MG/100ML; MG/100ML
50 INJECTION, SOLUTION INTRAVENOUS
Status: COMPLETED | OUTPATIENT
Start: 2019-03-01 | End: 2019-03-01

## 2019-03-01 RX ORDER — MIDAZOLAM HYDROCHLORIDE 1 MG/ML
INJECTION, SOLUTION INTRAMUSCULAR; INTRAVENOUS AS NEEDED
Status: DISCONTINUED | OUTPATIENT
Start: 2019-03-01 | End: 2019-03-01 | Stop reason: HOSPADM

## 2019-03-01 RX ORDER — GADOTERATE MEGLUMINE 376.9 MG/ML
15 INJECTION INTRAVENOUS
Status: COMPLETED | OUTPATIENT
Start: 2019-03-01 | End: 2019-03-01

## 2019-03-01 RX ORDER — SODIUM CHLORIDE, SODIUM LACTATE, POTASSIUM CHLORIDE, CALCIUM CHLORIDE 600; 310; 30; 20 MG/100ML; MG/100ML; MG/100ML; MG/100ML
INJECTION, SOLUTION INTRAVENOUS
Status: DISCONTINUED | OUTPATIENT
Start: 2019-03-01 | End: 2019-03-01 | Stop reason: HOSPADM

## 2019-03-01 RX ADMIN — SODIUM CHLORIDE, SODIUM LACTATE, POTASSIUM CHLORIDE, CALCIUM CHLORIDE: 600; 310; 30; 20 INJECTION, SOLUTION INTRAVENOUS at 07:45

## 2019-03-01 RX ADMIN — MIDAZOLAM HYDROCHLORIDE 3 MG: 1 INJECTION, SOLUTION INTRAMUSCULAR; INTRAVENOUS at 08:15

## 2019-03-01 RX ADMIN — SODIUM CHLORIDE, SODIUM LACTATE, POTASSIUM CHLORIDE, AND CALCIUM CHLORIDE 50 ML/HR: 600; 310; 30; 20 INJECTION, SOLUTION INTRAVENOUS at 07:38

## 2019-03-01 RX ADMIN — GADOTERATE MEGLUMINE 15 ML: 376.9 INJECTION INTRAVENOUS at 09:13

## 2019-03-01 RX ADMIN — MIDAZOLAM HYDROCHLORIDE 1 MG: 1 INJECTION, SOLUTION INTRAMUSCULAR; INTRAVENOUS at 08:20

## 2019-03-01 NOTE — ANESTHESIA PREPROCEDURE EVALUATION
Anesthetic History No history of anesthetic complications Review of Systems / Medical History Patient summary reviewed, nursing notes reviewed and pertinent labs reviewed Pulmonary Smoker (30 pack year hx.  has stopped) Neuro/Psych Psychiatric history (anxiety, depression, developmental delay) Cardiovascular Exercise tolerance: >4 METS 
  
GI/Hepatic/Renal 
  
 
 
 
 
 
 Endo/Other Obesity Other Findings Physical Exam 
 
Airway Mallampati: II 
TM Distance: 4 - 6 cm Neck ROM: normal range of motion Mouth opening: Normal 
 
 Cardiovascular Rhythm: regular Rate: normal 
 
 
 
 Dental 
No notable dental hx Pulmonary Breath sounds clear to auscultation Abdominal 
 
 
 
 Other Findings Anesthetic Plan ASA: 3 Anesthesia type: MAC and general - backup Induction: Intravenous Anesthetic plan and risks discussed with: Patient 
 
 
caregiver

## 2019-03-01 NOTE — ANESTHESIA POSTPROCEDURE EVALUATION
* No procedures listed *. Anesthesia Post Evaluation Multimodal analgesia: multimodal analgesia not used between 6 hours prior to anesthesia start to PACU discharge Patient location during evaluation: bedside Patient participation: complete - patient participated Level of consciousness: awake Pain management: adequate Airway patency: patent Anesthetic complications: no 
Cardiovascular status: acceptable Respiratory status: acceptable Hydration status: acceptable Post anesthesia nausea and vomiting:  none Visit Vitals /78 Pulse 75 Temp 36.9 °C (98.4 °F) Resp 18 Ht 5' 4\" (1.626 m) Wt 84.7 kg (186 lb 11.7 oz) SpO2 100% BMI 32.05 kg/m²

## 2019-03-01 NOTE — DISCHARGE INSTRUCTIONS
DISCHARGE SUMMARY from your Nurse    The following personal items collected during your admission are returned to you:   Dental Appliance:    Vision:    Hearing Aid:    Jewelry:    Clothing:    Other Valuables:    Valuables sent to safe:        PATIENT INSTRUCTIONS:    After general anesthesia or intravenous sedation, for 24 hours or while taking prescription Narcotics:  · Limit your activities  · Do not drive and operate hazardous machinery  · Do not make important personal or business decisions  · Do  not drink alcoholic beverages  · If there is redness at the IV site you should apply a warm compress to the area. If redness or soreness persist call your primary care physician. These are general instructions for a healthy lifestyle:    *  Please give a list of your current medications to your Primary Care Provider. *  Please update this list whenever your medications are discontinued, doses are      changed, or new medications (including over-the-counter products) are added. *  Please carry medication information at all times in case of emergency situations. No smoking / No tobacco products / Avoid exposure to second hand smoke    Surgeon General's Warning:  Quitting smoking now greatly reduces serious risk to your health. Obesity, smoking, and sedentary lifestyle greatly increases your risk for illness and disease. A healthy diet, regular physical exercise & weight monitoring are important for maintaining a healthy lifestyle. Congestive Heart Failure  You may be retaining fluid if you have a history of heart failure or if you experience any of the following symptoms:  Weight gain of 3 pounds or more overnight or 5 pounds in a week, increased swelling in our hands or feet or shortness of breath while lying flat in bed. Please call your doctor as soon as you notice any of these symptoms; do not wait until your next office visit.     Recognize signs and symptoms of STROKE:  F - face looks uneven  A - arms unable to move or move even  S - speech slurred or non-existent  T - time-call 911 as soon as signs and symptoms begin-DO NOT go         Back to bed or wait to see if you get better-TIME IS BRAIN. Warning signs of HEART ATTACK                Call 911 if you have these symptoms    · Chest discomfort. Most heart attacks involve discomfort in the center of the chest that lasts more than a few minutes, or that goes away and comes back. It can feel like uncomfortable pressure, squeezing, fullness, or pain. · Discomfort in other areas of the upper body. Symptoms can include pain or discomfort in one or both        Arms, the back, neck, jaw, or stomach. ·  Shortness of breath with or without chest discomfort. · Other signs may include breaking out in a cold sweat, nausea, or lightheadedness    Don't wait more than five minutes to call 911 - MINUTES MATTER! Fast action can save your life. Calling 911 is almost always the fastest way to get lifesaving treatment. Emergency Medical Services staff can begin treatment when they arrive - up to an hour sooner than if someone gets to the hospital by car.

## 2019-03-02 LAB
GAMMA INTERFERON BACKGROUND BLD IA-ACNC: 0.02 IU/ML
M TB IFN-G BLD-IMP: NEGATIVE
M TB IFN-G CD4+ BCKGRND COR BLD-ACNC: 0.03 IU/ML
MITOGEN IGNF BLD-ACNC: >10 IU/ML
QUANTIFERON INCUBATION, QF1T: NORMAL
QUANTIFERON TB2 AG: 0.02 IU/ML
SERVICE CMNT-IMP: NORMAL

## 2019-03-05 ENCOUNTER — HOSPITAL ENCOUNTER (OUTPATIENT)
Dept: LAB | Age: 54
Discharge: HOME | End: 2019-03-05
Payer: MEDICARE

## 2019-03-05 ENCOUNTER — HOSPITAL ENCOUNTER (OUTPATIENT)
Dept: NEUROLOGY | Age: 54
Discharge: HOME | End: 2019-03-05
Attending: PSYCHIATRY & NEUROLOGY
Payer: MEDICARE

## 2019-03-05 ENCOUNTER — OFFICE VISIT (OUTPATIENT)
Dept: ENDOCRINOLOGY | Age: 54
End: 2019-03-05

## 2019-03-05 VITALS
HEART RATE: 76 BPM | OXYGEN SATURATION: 100 % | RESPIRATION RATE: 16 BRPM | BODY MASS INDEX: 31.92 KG/M2 | SYSTOLIC BLOOD PRESSURE: 140 MMHG | DIASTOLIC BLOOD PRESSURE: 63 MMHG | WEIGHT: 187 LBS | HEIGHT: 64 IN | TEMPERATURE: 96.4 F

## 2019-03-05 DIAGNOSIS — E05.90 HYPERTHYROIDISM: Primary | ICD-10-CM

## 2019-03-05 DIAGNOSIS — R56.9 SEIZURE (HCC): ICD-10-CM

## 2019-03-05 PROCEDURE — 84480 ASSAY TRIIODOTHYRONINE (T3): CPT

## 2019-03-05 PROCEDURE — 83520 IMMUNOASSAY QUANT NOS NONAB: CPT

## 2019-03-05 PROCEDURE — 36415 COLL VENOUS BLD VENIPUNCTURE: CPT

## 2019-03-05 PROCEDURE — 95816 EEG AWAKE AND DROWSY: CPT

## 2019-03-05 PROCEDURE — 84443 ASSAY THYROID STIM HORMONE: CPT

## 2019-03-05 PROCEDURE — 84439 ASSAY OF FREE THYROXINE: CPT

## 2019-03-05 RX ORDER — ADHESIVE BANDAGE
30 BANDAGE TOPICAL DAILY PRN
COMMUNITY

## 2019-03-05 RX ORDER — MAGNESIUM CITRATE
296 SOLUTION, ORAL ORAL AS NEEDED
COMMUNITY

## 2019-03-05 RX ORDER — FACIAL-BODY WIPES
10 EACH TOPICAL AS NEEDED
COMMUNITY

## 2019-03-05 RX ORDER — ACETAMINOPHEN 500 MG
1000 TABLET ORAL
COMMUNITY

## 2019-03-05 RX ORDER — HYDROCORTISONE 1 %
CREAM (GRAM) TOPICAL 2 TIMES DAILY
COMMUNITY

## 2019-03-05 RX ORDER — KETOCONAZOLE 20 MG/G
CREAM TOPICAL DAILY
COMMUNITY
End: 2019-06-14 | Stop reason: SDUPTHER

## 2019-03-05 RX ORDER — DIPHENHYDRAMINE HCL 25 MG
25 TABLET ORAL
COMMUNITY

## 2019-03-05 RX ORDER — RISPERIDONE 4 MG/1
4 TABLET, FILM COATED ORAL 2 TIMES DAILY
COMMUNITY

## 2019-03-05 RX ORDER — RISPERIDONE 4 MG/1
1 TABLET, FILM COATED ORAL
COMMUNITY
End: 2019-06-14 | Stop reason: SDUPTHER

## 2019-03-05 NOTE — PROGRESS NOTES
Aneudy Jorgensen is a 47 y.o. male here for   Chief Complaint   Patient presents with    New Patient     referred by ABBY Fontana for Thryoid       1. Have you been to the ER, urgent care clinic since your last visit? Hospitalized since your last visit? -n/a    2. Have you seen or consulted any other health care providers outside of the [address] [place] [address] since your last visit?   Include any pap smears or colon screening.-n/a

## 2019-03-05 NOTE — PROGRESS NOTES
[facility] ENDOCRINOLOGY               Shelley Woo MD              [address] [address]           Patient Information  Date:3/5/2019  Name : Maru Castro 47 y.o.     YOB: 1964         Referred by: Florence Godoy.ABBY         Chief Complaint   Patient presents with   [facility] New Patient     referred by ABBY Fontana for Thryoid       History of present illness    Maru Castro is a 47 y.o. male  here for evaluation of hyperthyroidism. He lives in a group home, has underlying schizoaffective disorder, mental retardation, depression  He had TSH checked which was suppressed, but no prior history of thyroid disorders. No recent infection, iodine exposure, no history of lithium. He is here with the group home caregivers, he has tremors intermittently, otherwise no weight loss, or diarrhea, or excessive sweating. Unable to get history from the patient due to underlying psychiatric condition. Past Medical History:   Diagnosis Date    Anxiety     Depression     MR (mental retardation)        Current Outpatient Medications   Medication Sig    ketoconazole (NIZORAL) 2 % topical cream Apply  to affected area daily.  risperiDONE (RISPERDAL) 1 mg tablet Take 1 mg by mouth two (2) times a day.  risperiDONE (RISPERDAL) 2 mg tablet Take 1 mg by mouth nightly.  acetaminophen (TYLENOL) 500 mg tablet Take 1,000 mg by mouth every six (6) hours as needed for Pain.  bisacodyl (BISCOLAX) 10 mg suppository Insert 10 mg into rectum as needed.  diphenhydrAMINE (BENADRYL) 25 mg tablet Take 25 mg by mouth every eight (8) hours as needed for Sleep.  magnesium hydroxide (GUZMÁN MILK OF MAGNESIA) 400 mg/5 mL suspension Take 30 mL by mouth daily as needed for Constipation.  hydrocortisone (CORTAID) 1 % topical cream Apply  to affected area two (2) times a day.  use thin layer    magnesium citrate solution Take 296 mL by mouth as needed.  pantoprazole (PROTONIX) 40 mg tablet TAKE 1 TABLET BY MOUTH DAILY    polyethylene glycol (MIRALAX) 17 gram/dose powder MIX 1 CAPFUL (SEE 17 gm LINE) IN A GLASS OF WATER & DRINK ONCE DAILY FOR CONSTIPATION.  lacosamide (VIMPAT) 100 mg tab tablet Take 1 Tab by mouth two (2) times a day. Max Daily Amount: 200 mg.  divalproex DR (DEPAKOTE) 500 mg tablet Take 250 mg by mouth three (3) times daily.  QUEtiapine (SEROQUEL) 200 mg tablet Take 1 Tab by mouth nightly.  sertraline (ZOLOFT) 100 mg tablet Take 1 Tab by mouth daily. In the morning (Patient taking differently: Take 200 mg by mouth daily. In the morning)    sucralfate (CARAFATE) 1 gram tablet TAKE (1) TABLET BY MOUTH THREE TIMES DAILY prn    polyethylene glycol (MIRALAX) 17 gram packet Take 1 Packet by mouth every Monday, Wednesday, Friday. (Patient taking differently: Take 17 g by mouth daily.)    OTHER Chopped diet, dx: J69.0    OTHER Attends extra large pull-up, use as needed. Dx: 318.0    clobetasol (CLOBEX) 0.05 % lotion APPLY TO AFFECTED AREA 2 TIMES A DAY AS DIRECTED.  omeprazole (PRILOSEC) 20 mg capsule TAKE ONE CAPSULE BY MOUTH DAILY    LORazepam (ATIVAN) 1 mg tablet Take 1 Tab by mouth every six (6) hours as needed for Anxiety. Max Daily Amount: 4 mg.  amLODIPine-benazepril (LOTREL) 10-20 mg per capsule TAKE ONE CAPSULE BY MOUTH DAILY    multivitamin, tx-iron-ca-min (THERAPY M) 9 mg iron-400 mcg tab tablet TAKE ONE TABLET BY MOUTH DAILY.  aspirin delayed-release 81 mg tablet TAKE ONE TABLET BY MOUTH DAILY. No current facility-administered medications for this visit.         Allergies   Allergen Reactions    Chocolate [Cocoa] Unknown (comments)    Keppra [Levetiracetam] Unknown (comments)    Milk Unknown (comments)    Peanuts [Peanut Oil] Unknown (comments)    Pork/Porcine Containing Products Not Reported This Time    Tomato Not Reported This Time       Review of Systems:  - Unable to get history due to underlying psychiatric condition    Physical Examination:  Blood pressure 140/63, pulse 76, temperature 96.4 °F (35.8 °C), temperature source Oral, resp. rate 16, height 5' 4\" (1.626 m), weight 187 lb (84.8 kg), SpO2 100 %. Body mass index is 32.1 kg/m². - General: No distress  - HEENT: no exopthalmos, no periorbital edema, no scleral/conjunctival injection, EOMI, no lid lag or stare  - Neck: supple no thyromegaly, nodules, lymph nodes,   - Cardiovascular:regular,  normal S1 and S2, no murmurs  - Respiratory: clear to auscultation bilaterally  - Gastrointestinal: soft, nontender, nondistended, BS +  - Musculoskeletal: no proximal muscle weakness in upper or lower extremities  - Integumentary:  tremors, no edema  - Neurological: alert and oriented   - Psychiatric: normal mood and affect  - Skin - normal turgor    Data Reviewed:       Lab Results   Component Value Date/Time    TSH <0.006 (L) 01/23/2019 11:58 AM        [] Reviewed labs    Assessment/Plan:     1. Hyperthyroidism      Clinically does not have hyperadrenergic signs, TSH was suppressed, no recent iodine exposure. Reviewed prior thyroid function test which were normal  Repeat TSH, free T4, total T3 along with TSH receptor antibody  Thyroiditis versus early Graves' disease  Therapy based on the blood work. Schizoaffective disorder: Managed by a psychiatrist    Follow-up Disposition:  Return in about 3 months (around 6/5/2019) for labs bnv and follow up. Thank you for allowing me to participate in the care of this patient. Amaya Patton MD      Patient/caregiver verbalized understanding. Voice-recognition software was used to generate this report, which may result in some phonetic-based errors in the grammar and contents. Even though attempts were made to correct all the mistakes, some may have been missed and remained in the body of the report.

## 2019-03-06 LAB
T3 SERPL-MCNC: 149 NG/DL (ref 71–180)
T4 FREE SERPL-MCNC: 2.27 NG/DL (ref 0.82–1.77)
TSH RECEP AB SER-ACNC: 0.66 IU/L (ref 0–1.75)
TSH SERPL DL<=0.005 MIU/L-ACNC: <0.006 UIU/ML (ref 0.45–4.5)

## 2019-03-07 NOTE — PROGRESS NOTES
Start methimazole 20 mg in the morning, need to call assisted living or group home  Need labs before next visit TSH, free T4  If he has very high-grade fever methimazole needs to be stopped and needs CBC

## 2019-03-08 ENCOUNTER — TELEPHONE (OUTPATIENT)
Dept: ENDOCRINOLOGY | Age: 54
End: 2019-03-08

## 2019-03-08 DIAGNOSIS — E05.90 HYPERTHYROIDISM: Primary | ICD-10-CM

## 2019-03-08 RX ORDER — METHIMAZOLE 10 MG/1
20 TABLET ORAL
Qty: 180 TAB | Refills: 3 | Status: SHIPPED | OUTPATIENT
Start: 2019-03-08 | End: 2019-06-14 | Stop reason: SDUPTHER

## 2019-03-08 NOTE — TELEPHONE ENCOUNTER
Contacted group home to give results but entire medical staff is out until Monday.  Results and message from physician and lab orders faxed to Medical office [phone].       ----- Message from Shashi Chapa MD sent at 3/6/2019 10:33 PM EST -----  Start methimazole 20 mg in the morning, need to call assisted living or group home  Need labs before next visit TSH, free T4  If he has very high-grade fever methimazole needs to be stopped and needs CBC

## 2019-04-03 ENCOUNTER — DOCUMENTATION ONLY (OUTPATIENT)
Dept: FAMILY MEDICINE CLINIC | Age: 54
End: 2019-04-03

## 2019-04-03 ENCOUNTER — OFFICE VISIT (OUTPATIENT)
Dept: NEUROLOGY | Age: 54
End: 2019-04-03

## 2019-04-03 VITALS — HEIGHT: 64 IN | BODY MASS INDEX: 31.92 KG/M2 | WEIGHT: 187 LBS

## 2019-04-03 DIAGNOSIS — R56.9 SEIZURES (HCC): Primary | ICD-10-CM

## 2019-04-03 NOTE — PROGRESS NOTES
Linsey Rinaldi is a 47 y.o. male who presents with the following  Chief Complaint   Patient presents with    Seizure       HPI   Patient comes in for a follow up for MRI, EEG. No changes since last visit. No seizures. As per Dr. Zacarias Fitzpatrick note, here for follow up today of seizures. He has schizophrenia and significant behavior issues. Today his caregivers say that he is having major behavioral issues. He is in a new group home and has been there for 1 month. He is off Keppra but the current caregivers are not aware of the switch to depakote and vimpat. He has not had any seizures that they are aware of. Current AEDs:  Depakote DR 500mg TID  Vimpat 100mg BID     He also follows with psychiatry. He is on sertraline and seroquel. They feel like this needs to be adjusted further. Patient is acting out and aggressive. Allergies   Allergen Reactions    Chocolate [Cocoa] Unknown (comments)    Keppra [Levetiracetam] Unknown (comments)    Milk Unknown (comments)    Peanuts [Peanut Oil] Unknown (comments)    Pork/Porcine Containing Products Not Reported This Time    Tomato Not Reported This Time       Current Outpatient Medications   Medication Sig    methIMAzole (TAPAZOLE) 10 mg tablet Take 2 Tabs by mouth every morning.  ketoconazole (NIZORAL) 2 % topical cream Apply  to affected area daily.  risperiDONE (RISPERDAL) 1 mg tablet Take 1 mg by mouth two (2) times a day.  risperiDONE (RISPERDAL) 2 mg tablet Take 1 mg by mouth nightly.  acetaminophen (TYLENOL) 500 mg tablet Take 1,000 mg by mouth every six (6) hours as needed for Pain.  bisacodyl (BISCOLAX) 10 mg suppository Insert 10 mg into rectum as needed.  diphenhydrAMINE (BENADRYL) 25 mg tablet Take 25 mg by mouth every eight (8) hours as needed for Sleep.  magnesium hydroxide (GUZMÁN MILK OF MAGNESIA) 400 mg/5 mL suspension Take 30 mL by mouth daily as needed for Constipation.     hydrocortisone (CORTAID) 1 % topical cream Apply  to affected area two (2) times a day. use thin layer    magnesium citrate solution Take 296 mL by mouth as needed.  pantoprazole (PROTONIX) 40 mg tablet TAKE 1 TABLET BY MOUTH DAILY    lacosamide (VIMPAT) 100 mg tab tablet Take 1 Tab by mouth two (2) times a day. Max Daily Amount: 200 mg.  divalproex DR (DEPAKOTE) 500 mg tablet Take 250 mg by mouth three (3) times daily.  QUEtiapine (SEROQUEL) 200 mg tablet Take 1 Tab by mouth nightly.  sertraline (ZOLOFT) 100 mg tablet Take 1 Tab by mouth daily. In the morning (Patient taking differently: Take 200 mg by mouth daily. In the morning)    sucralfate (CARAFATE) 1 gram tablet TAKE (1) TABLET BY MOUTH THREE TIMES DAILY prn    polyethylene glycol (MIRALAX) 17 gram packet Take 1 Packet by mouth every Monday, Wednesday, Friday. (Patient taking differently: Take 17 g by mouth daily.)    OTHER Chopped diet, dx: J69.0    OTHER Attends extra large pull-up, use as needed. Dx: 318.0    polyethylene glycol (MIRALAX) 17 gram/dose powder MIX 1 CAPFUL (SEE 17 gm LINE) IN A GLASS OF WATER & DRINK ONCE DAILY FOR CONSTIPATION.  clobetasol (CLOBEX) 0.05 % lotion APPLY TO AFFECTED AREA 2 TIMES A DAY AS DIRECTED.  omeprazole (PRILOSEC) 20 mg capsule TAKE ONE CAPSULE BY MOUTH DAILY    LORazepam (ATIVAN) 1 mg tablet Take 1 Tab by mouth every six (6) hours as needed for Anxiety. Max Daily Amount: 4 mg.  amLODIPine-benazepril (LOTREL) 10-20 mg per capsule TAKE ONE CAPSULE BY MOUTH DAILY    multivitamin, tx-iron-ca-min (THERAPY M) 9 mg iron-400 mcg tab tablet TAKE ONE TABLET BY MOUTH DAILY.  aspirin delayed-release 81 mg tablet TAKE ONE TABLET BY MOUTH DAILY. No current facility-administered medications for this visit.         Social History     Tobacco Use   Smoking Status Former Smoker    Packs/day: 1.00    Years: 30.00    Pack years: 30.00    Types: Cigarettes   Smokeless Tobacco Never Used       Past Medical History:   Diagnosis Date    Anxiety     Depression     MR (mental retardation)     Schizoaffective disorder ([facility] [address]        History reviewed. No pertinent surgical history. History reviewed. No pertinent family history. Social History     Socioeconomic History    Marital status: SINGLE     Spouse name: Not on file    Number of children: Not on file    Years of education: Not on file    Highest education level: Not on file   Tobacco Use    Smoking status: Former Smoker     Packs/day: 1.00     Years: 30.00     Pack years: 30.00     Types: Cigarettes    Smokeless tobacco: Never Used   Substance and Sexual Activity    Alcohol use: No    Drug use: No    Sexual activity: Never       Review of Systems   Respiratory: Negative for shortness of breath and wheezing. Cardiovascular: Negative for chest pain and palpitations. Musculoskeletal: Negative for falls. Neurological: Negative for seizures and loss of consciousness. Psychiatric/Behavioral: Negative for depression and suicidal ideas. Remainder of comprehensive review is negative. Physical Exam :    Visit Vitals  Ht 5' 4\" (1.626 m)   Wt 84.8 kg (187 lb)   BMI 32.10 kg/m²               Results for orders placed or performed in visit on 03/05/19   TSH 3RD GENERATION   Result Value Ref Range    TSH <0.006 (L) 0.450 - 4.500 uIU/mL   T4, FREE   Result Value Ref Range    T4, Free 2.27 (H) 0.82 - 1.77 ng/dL   T3 TOTAL   Result Value Ref Range    T3, total 149 71 - 180 ng/dL   TSH RECEPTOR AB   Result Value Ref Range    Thyrotropin Receptor Ab, serum 0.66 0.00 - 1.75 IU/L       No orders of the defined types were placed in this encounter. No diagnosis found. MRI and EEG discussed in full. Keep Vimpat, Depakote for seizure prevention. Stay active. Keep track of seizures. Doing well. Agitation controlled with psych.            This note will not be viewable in [facility]

## 2019-05-23 ENCOUNTER — OFFICE VISIT (OUTPATIENT)
Dept: NEUROLOGY | Age: 54
End: 2019-05-23

## 2019-05-23 DIAGNOSIS — R46.89 AGGRESSION: ICD-10-CM

## 2019-05-23 DIAGNOSIS — E66.01 SEVERE OBESITY (BMI 35.0-39.9) WITH COMORBIDITY (HCC): ICD-10-CM

## 2019-05-23 DIAGNOSIS — F79 INTELLECTUAL DISABILITY: ICD-10-CM

## 2019-05-23 DIAGNOSIS — F20.3 UNDIFFERENTIATED SCHIZOPHRENIA (HCC): ICD-10-CM

## 2019-05-23 DIAGNOSIS — R41.3 MEMORY LOSS: ICD-10-CM

## 2019-05-23 DIAGNOSIS — R41.3 SHORT-TERM MEMORY LOSS: ICD-10-CM

## 2019-05-23 DIAGNOSIS — R56.9 SEIZURES (HCC): ICD-10-CM

## 2019-05-23 DIAGNOSIS — G31.84 MILD COGNITIVE IMPAIRMENT: Primary | ICD-10-CM

## 2019-05-23 DIAGNOSIS — R47.89 WORD FINDING DIFFICULTY: ICD-10-CM
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
## 2019-06-03 ENCOUNTER — DOCUMENTATION ONLY (OUTPATIENT)
Dept: FAMILY MEDICINE CLINIC | Age: 54
End: 2019-06-03

## 2019-06-04 ENCOUNTER — DOCUMENTATION ONLY (OUTPATIENT)
Dept: FAMILY MEDICINE CLINIC | Age: 54
End: 2019-06-04

## 2019-06-04 NOTE — PROGRESS NOTES
The [facility] order was signed & faxed to [phone] placed in scan folder to be scanned to chart.

## 2019-06-05 ENCOUNTER — OFFICE VISIT (OUTPATIENT)
Dept: NEUROLOGY | Age: 54
End: 2019-06-05

## 2019-06-05 VITALS
WEIGHT: 190 LBS | DIASTOLIC BLOOD PRESSURE: 82 MMHG | BODY MASS INDEX: 32.44 KG/M2 | HEIGHT: 64 IN | SYSTOLIC BLOOD PRESSURE: 110 MMHG | OXYGEN SATURATION: 96 % | HEART RATE: 84 BPM

## 2019-06-05 DIAGNOSIS — R56.9 SEIZURE (HCC): ICD-10-CM

## 2019-06-05 RX ORDER — CHLORPHENIRAMIN/PSEUDOEPHED/DM 1-15-5MG/5
LIQUID (ML) ORAL
Qty: 510 G | Refills: 98 | Status: SHIPPED | OUTPATIENT
Start: 2019-06-05

## 2019-06-05 RX ORDER — LAMOTRIGINE 100 MG/1
1 TABLET ORAL DAILY
Refills: 99 | COMMUNITY
Start: 2019-06-01

## 2019-06-05 RX ORDER — KETOCONAZOLE 20 MG/G
CREAM TOPICAL
Qty: 30 G | Refills: 98 | Status: SHIPPED | OUTPATIENT
Start: 2019-06-05

## 2019-06-05 RX ORDER — DEXTROMETHORPHAN POLISTIREX 30 MG/5 ML
SUSPENSION, EXTENDED RELEASE 12 HR ORAL AS NEEDED
COMMUNITY

## 2019-06-05 RX ORDER — BENZTROPINE MESYLATE 1 MG/1
1 TABLET ORAL 2 TIMES DAILY
Refills: 99 | COMMUNITY
Start: 2019-06-01

## 2019-06-05 RX ORDER — LOPERAMIDE HYDROCHLORIDE 2 MG/1
4 CAPSULE ORAL AS NEEDED
COMMUNITY

## 2019-06-05 RX ORDER — CHLORPROMAZINE HYDROCHLORIDE 100 MG/1
100 TABLET, FILM COATED ORAL
COMMUNITY

## 2019-06-05 RX ORDER — LACOSAMIDE 100 MG/1
100 TABLET ORAL 2 TIMES DAILY
Qty: 60 TAB | Refills: 5 | Status: SHIPPED | OUTPATIENT
Start: 2019-06-05 | End: 2019-01-01

## 2019-06-05 NOTE — PROGRESS NOTES
Dina Arshad is a 54 y.o. male who presents with the following  Chief Complaint   Patient presents with    Seizure       HPI    Patient comes in for a follow up for seizures. Has not had any seizures since before last visit. He has schizophrenia and significant behavior issues. Saw Dr. Francena Schirmer for first visit. To test soon. Caregivers are still saying he having major behavioral issues. He is in a new group home and has been there for 4 month. Off Keppra. Current AEDs:  Depakote DR 500mg TID  Vimpat 100mg BID     He also follows with psychiatry. He is on sertraline and seroquel. They feel like this needs to be adjusted further. Patient is acting out and aggressive. Allergies   Allergen Reactions    Chocolate [Cocoa] Unknown (comments)    Keppra [Levetiracetam] Unknown (comments)    Milk Unknown (comments)    Peanuts [Peanut Oil] Unknown (comments)    Pork/Porcine Containing Products Not Reported This Time    Tomato Not Reported This Time       Current Outpatient Medications   Medication Sig    lacosamide (VIMPAT) 100 mg tab tablet Take 1 Tab by mouth two (2) times a day. Max Daily Amount: 200 mg.    methIMAzole (TAPAZOLE) 10 mg tablet Take 2 Tabs by mouth every morning.  ketoconazole (NIZORAL) 2 % topical cream Apply  to affected area daily.  risperiDONE (RISPERDAL) 1 mg tablet Take 1 mg by mouth two (2) times a day.  risperiDONE (RISPERDAL) 2 mg tablet Take 1 mg by mouth nightly.  acetaminophen (TYLENOL) 500 mg tablet Take 1,000 mg by mouth every six (6) hours as needed for Pain.  bisacodyl (BISCOLAX) 10 mg suppository Insert 10 mg into rectum as needed.  diphenhydrAMINE (BENADRYL) 25 mg tablet Take 25 mg by mouth every eight (8) hours as needed for Sleep.  magnesium hydroxide (GUZMÁN MILK OF MAGNESIA) 400 mg/5 mL suspension Take 30 mL by mouth daily as needed for Constipation.     hydrocortisone (CORTAID) 1 % topical cream Apply  to affected area two (2) times a day. use thin layer    magnesium citrate solution Take 296 mL by mouth as needed.  pantoprazole (PROTONIX) 40 mg tablet TAKE 1 TABLET BY MOUTH DAILY    polyethylene glycol (MIRALAX) 17 gram/dose powder MIX 1 CAPFUL (SEE 17 gm LINE) IN A GLASS OF WATER & DRINK ONCE DAILY FOR CONSTIPATION.  divalproex DR (DEPAKOTE) 500 mg tablet Take 250 mg by mouth three (3) times daily.  clobetasol (CLOBEX) 0.05 % lotion APPLY TO AFFECTED AREA 2 TIMES A DAY AS DIRECTED.  QUEtiapine (SEROQUEL) 200 mg tablet Take 1 Tab by mouth nightly.  sertraline (ZOLOFT) 100 mg tablet Take 1 Tab by mouth daily. In the morning (Patient taking differently: Take 200 mg by mouth daily. In the morning)    sucralfate (CARAFATE) 1 gram tablet TAKE (1) TABLET BY MOUTH THREE TIMES DAILY prn    omeprazole (PRILOSEC) 20 mg capsule TAKE ONE CAPSULE BY MOUTH DAILY    LORazepam (ATIVAN) 1 mg tablet Take 1 Tab by mouth every six (6) hours as needed for Anxiety. Max Daily Amount: 4 mg.  polyethylene glycol (MIRALAX) 17 gram packet Take 1 Packet by mouth every Monday, Wednesday, Friday. (Patient taking differently: Take 17 g by mouth daily.)    OTHER Chopped diet, dx: J69.0    amLODIPine-benazepril (LOTREL) 10-20 mg per capsule TAKE ONE CAPSULE BY MOUTH DAILY    multivitamin, tx-iron-ca-min (THERAPY M) 9 mg iron-400 mcg tab tablet TAKE ONE TABLET BY MOUTH DAILY.  aspirin delayed-release 81 mg tablet TAKE ONE TABLET BY MOUTH DAILY.  OTHER Attends extra large pull-up, use as needed. Dx: 318.0     No current facility-administered medications for this visit. Social History     Tobacco Use   Smoking Status Former Smoker    Packs/day: 1.00    Years: 30.00    Pack years: 30.00    Types: Cigarettes   Smokeless Tobacco Never Used       Past Medical History:   Diagnosis Date    Anxiety     Depression     MR (mental retardation)     Schizoaffective disorder [address] [address]        History reviewed.  No pertinent surgical history. History reviewed. No pertinent family history. Social History     Socioeconomic History    Marital status: SINGLE     Spouse name: Not on file    Number of children: Not on file    Years of education: Not on file    Highest education level: Not on file   Tobacco Use    Smoking status: Former Smoker     Packs/day: 1.00     Years: 30.00     Pack years: 30.00     Types: Cigarettes    Smokeless tobacco: Never Used   Substance and Sexual Activity    Alcohol use: No    Drug use: No    Sexual activity: Never       Review of Systems   Eyes: Negative for blurred vision, double vision and photophobia. Cardiovascular: Negative for chest pain and palpitations. Gastrointestinal: Negative for nausea and vomiting. Neurological: Negative for dizziness, tingling, seizures, loss of consciousness and headaches. Psychiatric/Behavioral: Positive for depression and memory loss. Negative for hallucinations, substance abuse and suicidal ideas. The patient is nervous/anxious. The patient does not have insomnia. Remainder of comprehensive review is negative. Physical Exam :    Visit Vitals  /82   Pulse 84   Ht 5' 4\" (1.626 m)   Wt 86.2 kg (190 lb)   SpO2 96%   BMI 32.61 kg/m²       General: Well defined, nourished, and groomed individual in no acute distress.    Neck: Supple, nontender, no bruits, no pain with resistance to active range of motion.    Musculoskeletal: Extremities revealed no edema and had full range of motion of joints.    Psych: slow, mumbled speech     NEUROLOGICAL EXAMINATION:    Mental Status: Alert and oriented to person, place, and time    Cranial Nerves:    II, III, IV, VI: not assessed- glasses on     Extra-ocular movements are full and fluid. Fundoscopic exam was benign, no ptosis or nystagmus.    V-XII: Hearing is grossly intact. Facial features are symmetric, with normal sensation and strength. The palate rises symmetrically and the tongue protrudes midline. Sternocleidomastoids 5/5. Motor Examination: Normal tone, bulk, and strength, 3/5 muscle strength throughout. Coordination: Finger to nose was normal. No resting or intention tremor    Gait and Station: Steady while walking. Reflexes: DTRs 2+ throughout. Results for orders placed or performed in visit on 03/05/19   TSH 3RD GENERATION   Result Value Ref Range    TSH <0.006 (L) 0.450 - 4.500 uIU/mL   T4, FREE   Result Value Ref Range    T4, Free 2.27 (H) 0.82 - 1.77 ng/dL   T3 TOTAL   Result Value Ref Range    T3, total 149 71 - 180 ng/dL   TSH RECEPTOR AB   Result Value Ref Range    Thyrotropin Receptor Ab, serum 0.66 0.00 - 1.75 IU/L       Orders Placed This Encounter    lacosamide (VIMPAT) 100 mg tab tablet     Sig: Take 1 Tab by mouth two (2) times a day. Max Daily Amount: 200 mg. Dispense:  60 Tab     Refill:  5       1. Seizure ([address]          Seizures stable on Depakote, Vimpat. Keep these for prevention as these have been working well. Still doing well with group home but agitation, restless.    FU after neuropsych           This note will not be viewable in [facility]

## 2019-06-06 ENCOUNTER — DOCUMENTATION ONLY (OUTPATIENT)
Dept: FAMILY MEDICINE CLINIC | Age: 54
End: 2019-06-06

## 2019-06-06 NOTE — PROGRESS NOTES
The [facility] order for treatment for directions was put on Dr Vo's desk to process

## 2019-06-07 ENCOUNTER — HOSPITAL ENCOUNTER (OUTPATIENT)
Dept: LAB | Age: 54
Discharge: HOME | End: 2019-06-07
Payer: MEDICARE

## 2019-06-07 DIAGNOSIS — E05.90 HYPERTHYROIDISM: ICD-10-CM

## 2019-06-07 PROCEDURE — 36415 COLL VENOUS BLD VENIPUNCTURE: CPT

## 2019-06-07 PROCEDURE — 84443 ASSAY THYROID STIM HORMONE: CPT

## 2019-06-07 PROCEDURE — 84439 ASSAY OF FREE THYROXINE: CPT

## 2019-06-08 LAB
T4 FREE SERPL-MCNC: 1.14 NG/DL (ref 0.82–1.77)
TSH SERPL DL<=0.005 MIU/L-ACNC: 5.8 UIU/ML (ref 0.45–4.5)

## 2019-06-11 ENCOUNTER — DOCUMENTATION ONLY (OUTPATIENT)
Dept: FAMILY MEDICINE CLINIC | Age: 54
End: 2019-06-11

## 2019-06-14 ENCOUNTER — OFFICE VISIT (OUTPATIENT)
Dept: ENDOCRINOLOGY | Age: 54
End: 2019-06-14

## 2019-06-14 VITALS
RESPIRATION RATE: 14 BRPM | HEIGHT: 64 IN | HEART RATE: 82 BPM | OXYGEN SATURATION: 99 % | TEMPERATURE: 97.4 F | BODY MASS INDEX: 33.29 KG/M2 | DIASTOLIC BLOOD PRESSURE: 77 MMHG | SYSTOLIC BLOOD PRESSURE: 121 MMHG | WEIGHT: 195 LBS

## 2019-06-14 DIAGNOSIS — R25.1 TREMORS OF NERVOUS SYSTEM: ICD-10-CM

## 2019-06-14 DIAGNOSIS — E05.90 HYPERTHYROIDISM: Primary | ICD-10-CM

## 2019-06-14 DIAGNOSIS — E05.90 HYPERTHYROIDISM: ICD-10-CM

## 2019-06-14 DIAGNOSIS — F25.9 SCHIZOAFFECTIVE DISORDER, UNSPECIFIED TYPE (HCC): ICD-10-CM

## 2019-06-14 RX ORDER — METHIMAZOLE 10 MG/1
10 TABLET ORAL
Qty: 90 TAB | Refills: 3 | Status: SHIPPED | OUTPATIENT
Start: 2019-06-14 | End: 2019-01-01 | Stop reason: DRUGHIGH

## 2019-06-14 RX ORDER — DOCUSATE SODIUM 100 MG/1
100 CAPSULE, LIQUID FILLED ORAL DAILY
COMMUNITY
End: 2019-01-01 | Stop reason: SDUPTHER

## 2019-06-14 NOTE — PROGRESS NOTES
Luke Araiza is a 54 y.o. male here for   Chief Complaint   Patient presents with    Thyroid Problem       1. Have you been to the ER, urgent care clinic since your last visit? Hospitalized since your last visit? -no    2. Have you seen or consulted any other health care providers outside of the [address] [place] [address] since your last visit?   Include any pap smears or colon screening.-no

## 2019-06-14 NOTE — PROGRESS NOTES
[facility] ENDOCRINOLOGY               Jenni Wade MD              [address] [address]           Patient Information  Date:6/14/2019  Name : Cipriano Snyder 54 y.o.     YOB: 1964         Referred by: Gilbert Salomon NP         Chief Complaint   Patient presents with    Thyroid Problem       History of present illness    Cipriano Snyder is a 54 y.o. male  here for follow-up of hyperthyroidism. He lives in a group home, has underlying schizoaffective disorder, mental retardation, depression  Hyperthyroidism, started on methimazole in March 2019  No recent infection, iodine exposure, no history of lithium. He is here with the group home caregivers, he has tremors intermittently, he has been gaining weight  No fever, sores in the mouth    Unable to get history from the patient due to underlying psychiatric condition. Past Medical History:   Diagnosis Date    Anxiety     Depression     MR (mental retardation)     Schizoaffective disorder (HCC)        Current Outpatient Medications   Medication Sig    docusate sodium (COLACE) 100 mg capsule Take 100 mg by mouth daily.  aluminum hydrox-magnesium carb (ACID GONE ANTACID)  mg/15 mL suspension Take 15 mL by mouth every six (6) hours as needed for Indigestion.  phenol throat spray (SORE THROAT) spray Take 1 Spray by mouth as needed.  ketoconazole (NIZORAL) 2 % topical cream APPLY TOPICALLY TO AFFECTED AREA ON FACE ONCE DAILY    CLEARLAX 17 gram/dose powder MIX 1 CAPFUL (SEE 17 gm LINE) IN A GLASS OF WATER & DRINK ONCE DAILY FOR CONSTIPATION.  benztropine (COGENTIN) 1 mg tablet Take 1 Tab by mouth two (2) times a day.  lamoTRIgine (LAMICTAL) 100 mg tablet Take 1 Tab by mouth daily.  chlorproMAZINE (THORAZINE) 100 mg tablet Take 100 mg by mouth two (2) times daily as needed.     mineral oil (FLEET MINERAL OIL) enema Insert  into rectum as needed for Constipation.  guaifenesin/dextromethorphan (ROBAFEN DM PO) Take 10 mL by mouth every six (6) hours as needed.  loperamide (ANTI-DIARRHEAL, LOPERAMIDE,) 2 mg capsule Take 4 mg by mouth as needed.  methIMAzole (TAPAZOLE) 10 mg tablet Take 2 Tabs by mouth every morning.  risperiDONE (RISPERDAL) 4 mg tablet Take 4 mg by mouth two (2) times a day.  acetaminophen (TYLENOL) 500 mg tablet Take 1,000 mg by mouth every six (6) hours as needed for Pain.  bisacodyl (BISCOLAX) 10 mg suppository Insert 10 mg into rectum as needed.  diphenhydrAMINE (BENADRYL) 25 mg tablet Take 25 mg by mouth every eight (8) hours as needed for Sleep.  magnesium hydroxide (GUZMÁN MILK OF MAGNESIA) 400 mg/5 mL suspension Take 30 mL by mouth daily as needed for Constipation.  hydrocortisone (CORTAID) 1 % topical cream Apply  to affected area two (2) times a day. use thin layer    magnesium citrate solution Take 296 mL by mouth as needed.  divalproex DR (DEPAKOTE) 500 mg tablet Take 500 mg by mouth three (3) times daily.  QUEtiapine (SEROQUEL) 200 mg tablet Take 1 Tab by mouth nightly.  sucralfate (CARAFATE) 1 gram tablet TAKE (1) TABLET BY MOUTH THREE TIMES DAILY prn (Patient taking differently: TAKE (1) TABLET BY MOUTH THREE TIMES DAILY)    OTHER Chopped diet, dx: J69.0    OTHER Attends extra large pull-up, use as needed. Dx: 318.0    lacosamide (VIMPAT) 100 mg tab tablet Take 1 Tab by mouth two (2) times a day. Max Daily Amount: 200 mg.  pantoprazole (PROTONIX) 40 mg tablet TAKE 1 TABLET BY MOUTH DAILY    clobetasol (CLOBEX) 0.05 % lotion APPLY TO AFFECTED AREA 2 TIMES A DAY AS DIRECTED.  sertraline (ZOLOFT) 100 mg tablet Take 1 Tab by mouth daily. In the morning (Patient taking differently: Take 200 mg by mouth daily.  In the morning)    omeprazole (PRILOSEC) 20 mg capsule TAKE ONE CAPSULE BY MOUTH DAILY    LORazepam (ATIVAN) 1 mg tablet Take 1 Tab by mouth every six (6) hours as needed for Anxiety. Max Daily Amount: 4 mg.  polyethylene glycol (MIRALAX) 17 gram packet Take 1 Packet by mouth every Monday, Wednesday, Friday. (Patient taking differently: Take 17 g by mouth daily.)    amLODIPine-benazepril (LOTREL) 10-20 mg per capsule TAKE ONE CAPSULE BY MOUTH DAILY    multivitamin, tx-iron-ca-min (THERAPY M) 9 mg iron-400 mcg tab tablet TAKE ONE TABLET BY MOUTH DAILY.  aspirin delayed-release 81 mg tablet TAKE ONE TABLET BY MOUTH DAILY. No current facility-administered medications for this visit. Allergies   Allergen Reactions    Chocolate [Cocoa] Unknown (comments)    Keppra [Levetiracetam] Unknown (comments)    Milk Unknown (comments)    Peanuts [Peanut Oil] Unknown (comments)    Pork/Porcine Containing Products Not Reported This Time    Tomato Not Reported This Time       Review of Systems:  - Unable to get history due to underlying psychiatric condition    Physical Examination:  Blood pressure 121/77, pulse 82, temperature 97.4 °F (36.3 °C), temperature source Oral, resp. rate 14, height 5' 4\" (1.626 m), weight 195 lb (88.5 kg), SpO2 99 %. Body mass index is 33.47 kg/m². - General: No distress  - HEENT: no exopthalmos, no periorbital edema, no scleral/conjunctival injection, EOMI, no lid lag or stare  - Neck: supple no thyromegaly, nodules, lymph nodes,   - Cardiovascular:regular,  normal S1 and S2, no murmurs  - Respiratory: clear to auscultation bilaterally  - Gastrointestinal: soft, nontender, nondistended, BS +  - Musculoskeletal: no proximal muscle weakness in upper or lower extremities  - Integumentary:  tremors, no edema  -   - Skin - normal turgor    Data Reviewed:       Lab Results   Component Value Date/Time    TSH 5.800 (H) 06/07/2019 10:36 AM    T4, Free 1.14 06/07/2019 10:36 AM        [] Reviewed labs    Assessment/Plan:     1.  Hyperthyroidism      Methimazole 3/2019   Decrease to 10 mg in AM         Schizoaffective disorder: Managed by a psychiatrist    Tremors: Related to underlying psych disorder, medications,  Unlikely due to hyperthyroidism given the improvement    Thank you for allowing me to participate in the care of this patient. Karoline Mathis MD      Patient/caregiver verbalized understanding. Voice-recognition software was used to generate this report, which may result in some phonetic-based errors in the grammar and contents. Even though attempts were made to correct all the mistakes, some may have been missed and remained in the body of the report.

## 2019-07-02 NOTE — TELEPHONE ENCOUNTER
Fazal Kelley, from [facility], questioning allergies to peanuts, tomatoes, milk and chocolate. Can they please have a prescription to see an allergist to confirm or discontinue allergies listed. Family states they were unaware of these allergies.    Fax script to [phone] attn: Fazal Kelley  Phone [phone]

## 2019-08-27 NOTE — TELEPHONE ENCOUNTER
----- Message from Harsh Zepeda sent at 8/27/2019  3:40 PM EDT -----  Regarding: Dr Ashley Laurent first and last name:  Murvin Paget, pts medical coordinator at the [address] [place] [address],      Reason for call:to find out the results of testing he had during his 5/23/19 visit      Callback required yes/no and why:  Yes for test results     Best contact number(s):[phone]      Details to clarify the request:results can be faxed to [phone], attn: Ismael Dominguez

## 2019-08-28 NOTE — TELEPHONE ENCOUNTER
Sumeet Patel (medical manager) from group [facility] calling again to check status of return call  Best #[phone]

## 2019-09-25 NOTE — PROGRESS NOTES
Neo Paez is a 54 y.o. male here for   Chief Complaint   Patient presents with    Thyroid Problem       1. Have you been to the ER, urgent care clinic since your last visit? Hospitalized since your last visit? -no    2. Have you seen or consulted any other health care providers outside of the [address] [place] [address] since your last visit?   Include any pap smears or colon screening.-no

## 2019-09-26 NOTE — LETTER
9/28/19 Patient: Claire Castro YOB: 1964 Date of Visit: 9/26/2019 Ruel Gonzalez NP 
[address] [address] VIA Facsimile: [phone] Dear Ruel Gonzalez NP, Thank you for referring Mr. Claire Castro to [address] [place] [address] for evaluation. My notes for this consultation are attached. If you have questions, please do not hesitate to call me. I look forward to following your patient along with you. Sincerely, Cinthia Khanna MD

## 2019-10-01 NOTE — PROGRESS NOTES
[facility] CMN for durable medical was put on [facility] DALILA desk to process

## 2019-10-03 NOTE — PROGRESS NOTES
Home Care Delivered CMN for durable medical was signed & faxed to [phone], ok,Copy placed in scan folder to be scanned to chart.

## 2019-11-05 NOTE — PROGRESS NOTES
Grover Duran is a 54 y.o. male who presents with the following  Chief Complaint   Patient presents with    Seizure       HPI     Patient comes in for a follow up for seizures. Has not had any seizures since before last visit. He is doing well on his current medications. agitation is the biggest issue being treated with psychiatry.       He has schizophrenia and significant behavior issues. Saw Dr. Sorin Knowles first visit and has not seen him since. Caregivers are still saying he having major behavioral issues. But less.   Tracy Lyons is in a new group home and has been there for months. On Risperdal, Seroquel.      Current AEDs:  Depakote DR 500mg TID  Vimpat 100mg BID     He also follows with psychiatry. He is on sertraline and seroquel.   Patient is acting out and aggressive at times. Did take a fall in the bathroom. Allergies   Allergen Reactions    Chocolate [Cocoa] Unknown (comments)    Keppra [Levetiracetam] Unknown (comments)    Milk Unknown (comments)    Peanuts [Peanut Oil] Unknown (comments)    Pork/Porcine Containing Products Not Reported This Time    Tomato Not Reported This Time       Current Outpatient Medications   Medication Sig    lacosamide (VIMPAT) 100 mg tab tablet Take 1 Tab by mouth two (2) times a day. Max Daily Amount: 200 mg.  divalproex DR (DEPAKOTE) 500 mg tablet Take 1 Tab by mouth three (3) times daily.  lidocaine-prilocaine (EMLA) topical cream Apply  to affected area as needed for Pain.  methIMAzole (TAPAZOLE) 5 mg tablet Take 1 Tab by mouth daily. Stop 10 mg    docusate sodium (COLACE) 100 mg capsule TAKE (1) CAPSULE BY MOUTH DAILY    aluminum hydrox-magnesium carb (ACID GONE ANTACID)  mg/15 mL suspension Take 15 mL by mouth every six (6) hours as needed for Indigestion.  phenol throat spray (SORE THROAT) spray Take 1 Spray by mouth as needed.     ketoconazole (NIZORAL) 2 % topical cream APPLY TOPICALLY TO AFFECTED AREA ON FACE ONCE DAILY    CLEARLAX 17 gram/dose powder MIX 1 CAPFUL (SEE 17 gm LINE) IN A GLASS OF WATER & DRINK ONCE DAILY FOR CONSTIPATION.  benztropine (COGENTIN) 1 mg tablet Take 1 Tab by mouth two (2) times a day.  lamoTRIgine (LAMICTAL) 100 mg tablet Take 1 Tab by mouth daily.  chlorproMAZINE (THORAZINE) 100 mg tablet Take 100 mg by mouth two (2) times daily as needed.  mineral oil (FLEET MINERAL OIL) enema Insert  into rectum as needed for Constipation.  guaifenesin/dextromethorphan (ROBAFEN DM PO) Take 10 mL by mouth every six (6) hours as needed.  loperamide (ANTI-DIARRHEAL, LOPERAMIDE,) 2 mg capsule Take 4 mg by mouth as needed.  risperiDONE (RISPERDAL) 4 mg tablet Take 4 mg by mouth two (2) times a day.  acetaminophen (TYLENOL) 500 mg tablet Take 1,000 mg by mouth every six (6) hours as needed for Pain.  bisacodyl (BISCOLAX) 10 mg suppository Insert 10 mg into rectum as needed.  diphenhydrAMINE (BENADRYL) 25 mg tablet Take 25 mg by mouth every eight (8) hours as needed for Sleep.  magnesium hydroxide (GUZMÁN MILK OF MAGNESIA) 400 mg/5 mL suspension Take 30 mL by mouth daily as needed for Constipation.  hydrocortisone (CORTAID) 1 % topical cream Apply  to affected area two (2) times a day. use thin layer    magnesium citrate solution Take 296 mL by mouth as needed.  QUEtiapine (SEROQUEL) 200 mg tablet Take 1 Tab by mouth nightly.  sucralfate (CARAFATE) 1 gram tablet TAKE (1) TABLET BY MOUTH THREE TIMES DAILY prn (Patient taking differently: TAKE (1) TABLET BY MOUTH THREE TIMES DAILY)    OTHER Chopped diet, dx: J69.0    OTHER Attends extra large pull-up, use as needed. Dx: 318.0     No current facility-administered medications for this visit.         Social History     Tobacco Use   Smoking Status Former Smoker    Packs/day: 1.00    Years: 30.00    Pack years: 30.00    Types: Cigarettes   Smokeless Tobacco Never Used       Past Medical History:   Diagnosis Date    Anxiety     Depression     MR (mental retardation)     Schizoaffective disorder ([facility] [address]        History reviewed. No pertinent surgical history. History reviewed. No pertinent family history. Social History     Socioeconomic History    Marital status: SINGLE     Spouse name: Not on file    Number of children: Not on file    Years of education: Not on file    Highest education level: Not on file   Tobacco Use    Smoking status: Former Smoker     Packs/day: 1.00     Years: 30.00     Pack years: 30.00     Types: Cigarettes    Smokeless tobacco: Never Used   Substance and Sexual Activity    Alcohol use: No    Drug use: No    Sexual activity: Never       Review of Systems   Eyes: Negative for blurred vision, double vision and photophobia. Gastrointestinal: Negative for nausea and vomiting. Musculoskeletal: Positive for falls. Neurological: Negative for dizziness, tingling, seizures, loss of consciousness and headaches. Psychiatric/Behavioral: Negative for hallucinations, memory loss and substance abuse. The patient is nervous/anxious. The patient does not have insomnia. Remainder of comprehensive review is negative. Physical Exam :    Visit Vitals  /74   Ht 5' 4\" (1.626 m)   Wt 87.1 kg (192 lb)   BMI 32.96 kg/m²     General: Well defined, nourished, and groomed individual in no acute distress.    Neck: Supple, nontender, no bruits, no pain with resistance to active range of motion.    Musculoskeletal: Extremities revealed no edema and had full range of motion of joints.    Psych: slow, mumbled speech      NEUROLOGICAL EXAMINATION:    Mental Status: Alert and oriented to person, place, and time     Cranial Nerves:    II, III, IV, VI: not assessed- glasses on     Extra-ocular movements are full and fluid. Fundoscopic exam was benign, no ptosis or nystagmus.    V-XII: Hearing is grossly intact. Facial features are symmetric, with normal sensation and strength.  The palate rises symmetrically and the tongue protrudes midline. Sternocleidomastoids 5/5.      Motor Examination: Normal tone, bulk, and strength, 3/5 muscle strength throughout.      Coordination: Finger to nose was normal. No resting or intention tremor     Gait and Station: Steady while walking.     Reflexes: DTRs 2+ throughout.          Results for orders placed or performed in visit on 09/19/19   T4, FREE   Result Value Ref Range    T4, Free 0.89 0.82 - 1.77 ng/dL   TSH 3RD GENERATION   Result Value Ref Range    TSH 18.610 (H) 0.450 - 4.500 uIU/mL       Orders Placed This Encounter    DEXA BONE DENSITY STUDY AXIAL     Standing Status:   Future     Standing Expiration Date:   5/5/2020     Order Specific Question:   Reason for Exam     Answer:   long term AED    lacosamide (VIMPAT) 100 mg tab tablet     Sig: Take 1 Tab by mouth two (2) times a day. Max Daily Amount: 200 mg. Dispense:  60 Tab     Refill:  5    divalproex DR (DEPAKOTE) 500 mg tablet     Sig: Take 1 Tab by mouth three (3) times daily. Dispense:  90 Tab     Refill:  5       1. Seizure ([address]    2. Localized osteoporosis ([place])         epilepsy stable on Vimpat, Depakote. Keep these. With long term use AED comes low bone density. We will need a DEXA scan to evaluate this further. Keep active. Working with psych to help with agitation. Get in with Dr. Jose Antonio Dawn for testing further.              This note will not be viewable in [facility]

## 2020-01-01 ENCOUNTER — TELEPHONE (OUTPATIENT)
Dept: FAMILY MEDICINE CLINIC | Age: 55
End: 2020-01-01

## 2020-01-01 ENCOUNTER — DOCUMENTATION ONLY (OUTPATIENT)
Dept: FAMILY MEDICINE CLINIC | Age: 55
End: 2020-01-01

## 2020-01-01 ENCOUNTER — VIRTUAL VISIT (OUTPATIENT)
Dept: NEUROLOGY | Age: 55
End: 2020-01-01

## 2020-01-01 DIAGNOSIS — R56.9 SEIZURES (HCC): Primary | ICD-10-CM

## 2020-01-01 DIAGNOSIS — R56.9 SEIZURE (HCC): ICD-10-CM

## 2020-01-01 LAB
T3 SERPL-MCNC: 72 NG/DL (ref 71–180)
T4 FREE SERPL-MCNC: 1.04 NG/DL (ref 0.82–1.77)
TSH SERPL DL<=0.005 MIU/L-ACNC: 7.18 UIU/ML (ref 0.45–4.5)

## 2020-01-01 RX ORDER — DIVALPROEX SODIUM 500 MG/1
TABLET, EXTENDED RELEASE ORAL
Qty: 90 TAB | Refills: 5 | Status: SHIPPED | OUTPATIENT
Start: 2020-01-01

## 2020-01-01 RX ORDER — LACOSAMIDE 100 MG/1
100 TABLET ORAL 2 TIMES DAILY
Qty: 60 TAB | Refills: 5 | Status: SHIPPED | OUTPATIENT
Start: 2020-01-01

## 2020-01-01 RX ORDER — PANTOPRAZOLE SODIUM 40 MG/1
TABLET, DELAYED RELEASE ORAL
Qty: 31 TAB | Status: SHIPPED | OUTPATIENT
Start: 2020-01-01

## 2020-01-17 NOTE — PROGRESS NOTES
The [facility] physicians standing orders were put on [facility] desk to process

## 2020-01-22 NOTE — PROGRESS NOTES
The [facility] physicians standing orders were signed & faxed to [phone],ok,Copy placed in scan folder to be scanned to chart.

## 2020-05-12 NOTE — PROGRESS NOTES
[facility] forms were put on [facility] desk to process

## 2020-05-14 NOTE — TELEPHONE ENCOUNTER
The [facility] medication order forms were signed & faxed to [phone],ok,Copy placed in scan folder to be scanned to chart.

## 2020-05-14 NOTE — PROGRESS NOTES
Ira Wilcox is a 54 y.o. male who was seen by synchronous (real-time) audio-video technology on 5/14/2020. Consent: Ira Wilcox, who was seen by synchronous (real-time) audio-video technology, and/or his healthcare decision maker, is aware that this patient-initiated, Telehealth encounter on 5/14/2020 is a billable service, with coverage as determined by his insurance carrier. He is aware that he may receive a bill and has provided verbal consent to proceed: Yes. Patient comes in for a follow up for seizures VIA virtual call with caregivers. Has not had any seizures since before last visit.  He is doing well on his current medications. Still agitated on a day to day basis but is better. Being quarentine is not helping though.  
Devonte May has schizophrenia and significant behavior issues. Saw Dr. Suyapa Branham first visit and has not seen him since. On Risperdal, Seroquel.  
  
Current AEDs: 
Depakote DR 500mg TID Vimpat 100mg BID 
  
He also follows with psychiatry. He is on sertraline and seroquel.   Patient is acting out and aggressive at times. Eating and sleeping well. Assessment & Plan:  
Diagnoses and all orders for this visit: 1. Seizure ([address] 
-     lacosamide (Vimpat) 100 mg tab tablet; Take 1 Tab by mouth two (2) times a day. Max Daily Amount: 200 mg. Other orders 
-     divalproex ER (Depakote ER) 500 mg ER tablet; Take 3 tablets by mouth nightly. I spent at least 23 minutes on this visit with this established patient. ([id number]) Subjective:  
Ira Wilcox is a 54 y.o. male who was seen for Follow-up and Seizure Prior to Admission medications Medication Sig Start Date End Date Taking? Authorizing Provider  
divalproex ER (Depakote ER) 500 mg ER tablet Take 3 tablets by mouth nightly.  5/14/20  Yes OChidi Morgan, NP  
lacosamide (Vimpat) 100 mg tab tablet Take 1 Tab by mouth two (2) times a day. Max Daily Amount: 200 mg. 5/14/20  Yes Mahendra Schilling NP  
pantoprazole (PROTONIX) 40 mg tablet TAKE 1 TABLET BY MOUTH DAILY 1/22/20  Yes Murtaza Arellano MD  
sucralfate (CARAFATE) 1 gram tablet TAKE ONE (1) TABLET BY MOUTH THREE TIMES A DAY. 12/17/19  Yes Murtaza Arellano MD  
sucralfate (CARAFATE) 1 gram tablet TAKE (1) TABLET BY MOUTH THREE TIMES DAILY prn 12/12/19  Yes Murtaza Arellano MD  
lidocaine-prilocaine (EMLA) topical cream Apply  to affected area as needed for Pain. Yes Provider, Historical  
methIMAzole (TAPAZOLE) 5 mg tablet Take 1 Tab by mouth daily. Stop 10 mg 9/26/19  Yes William Suggs MD  
docusate sodium (COLACE) 100 mg capsule TAKE (1) CAPSULE BY MOUTH DAILY 7/11/19  Yes Murtaza Arellano MD  
aluminum hydrox-magnesium carb (ACID GONE ANTACID)  mg/15 mL suspension Take 15 mL by mouth every six (6) hours as needed for Indigestion. Yes Provider, Historical  
phenol throat spray (SORE THROAT) spray Take 1 Spray by mouth as needed. Yes Provider, Historical  
ketoconazole (NIZORAL) 2 % topical cream APPLY TOPICALLY TO AFFECTED AREA ON FACE ONCE DAILY 6/5/19  Yes Murtaza Arellano MD  
CLEARLAX 17 gram/dose powder MIX 1 CAPFUL (SEE 17 gm LINE) IN A GLASS OF WATER & DRINK ONCE DAILY FOR CONSTIPATION. 6/5/19  Yes Murtaza Arellano MD  
benztropine (COGENTIN) 1 mg tablet Take 1 Tab by mouth two (2) times a day. 6/1/19  Yes Provider, Historical  
lamoTRIgine (LAMICTAL) 100 mg tablet Take 1 Tab by mouth daily. 6/1/19  Yes Provider, Historical  
chlorproMAZINE (THORAZINE) 100 mg tablet Take 100 mg by mouth two (2) times daily as needed. Yes Provider, Historical  
mineral oil (FLEET MINERAL OIL) enema Insert  into rectum as needed for Constipation. Yes Provider, Historical  
guaifenesin/dextromethorphan (ROBAFEN DM PO) Take 10 mL by mouth every six (6) hours as needed.    Yes Provider, Historical  
loperamide (ANTI-DIARRHEAL, LOPERAMIDE,) 2 mg capsule Take 4 mg by mouth as needed. Yes Provider, Historical  
risperiDONE (RISPERDAL) 4 mg tablet Take 4 mg by mouth two (2) times a day. Yes Provider, Historical  
acetaminophen (TYLENOL) 500 mg tablet Take 1,000 mg by mouth every six (6) hours as needed for Pain. Yes Provider, Historical  
bisacodyl (BISCOLAX) 10 mg suppository Insert 10 mg into rectum as needed. Yes Provider, Historical  
diphenhydrAMINE (BENADRYL) 25 mg tablet Take 25 mg by mouth every eight (8) hours as needed for Sleep. Yes Provider, Historical  
magnesium hydroxide (GUZMÁN MILK OF MAGNESIA) 400 mg/5 mL suspension Take 30 mL by mouth daily as needed for Constipation. Yes Provider, Historical  
hydrocortisone (CORTAID) 1 % topical cream Apply  to affected area two (2) times a day. use thin layer   Yes Provider, Historical  
magnesium citrate solution Take 296 mL by mouth as needed. Yes Provider, Historical  
QUEtiapine (SEROQUEL) 200 mg tablet Take 1 Tab by mouth nightly. 10/19/18  Yes Princess Logan NP  
OTHER Chopped diet, dx: J69.0 10/1/18  Yes Princess Logan NP  
OTHER Attends extra large pull-up, use as needed. Dx: 318.0 2/25/15  Yes Princess Logan NP  
divalproex ER (DEPAKOTE ER) 500 mg ER tablet Take 3 tablets by mouth nightly. 11/14/19 5/14/20  OTere Morgan NP  
lacosamide (VIMPAT) 100 mg tab tablet Take 1 Tab by mouth two (2) times a day. Max Daily Amount: 200 mg. 11/5/19 5/14/20  Tere Schilling NP Allergies Allergen Reactions  Chocolate [Cocoa] Unknown (comments)  Keppra [Levetiracetam] Unknown (comments)  Milk Unknown (comments)  Peanuts [Peanut Oil] Unknown (comments)  Pork/Porcine Containing Products Not Reported This Time  Tomato Not Reported This Time Patient Active Problem List  
Diagnosis Code  Anxiety state F41.1  Intellectual disability F68  
 Constipation K59.00  Hyperlipidemia E78.5  Essential hypertension I10  Severe obesity (BMI 35.0-39. 9) with comorbidity ([facility] [address] E66.01  
  Mood disorder ([facility]) F39  
 Hyperthyroidism E05.90  
 Schizoaffective disorder ([facility]) F25.9 Patient Active Problem List  
 Diagnosis Date Noted  Schizoaffective disorder ([facility] 06/14/2019  Hyperthyroidism 03/05/2019  Mood disorder ([facility] 07/26/2018  Severe obesity (BMI 35.0-39. 9) with comorbidity ([facility] 03/28/2018  Essential hypertension 01/11/2017  Hyperlipidemia 07/10/2012  Constipation 09/19/2011  Anxiety state 08/02/2010  Intellectual disability 08/02/2010 Current Outpatient Medications Medication Sig Dispense Refill  divalproex ER (Depakote ER) 500 mg ER tablet Take 3 tablets by mouth nightly. 90 Tab 5  
 lacosamide (Vimpat) 100 mg tab tablet Take 1 Tab by mouth two (2) times a day. Max Daily Amount: 200 mg. 60 Tab 5  pantoprazole (PROTONIX) 40 mg tablet TAKE 1 TABLET BY MOUTH DAILY 31 Tab PRN  
 sucralfate (CARAFATE) 1 gram tablet TAKE ONE (1) TABLET BY MOUTH THREE TIMES A DAY. 93 Tab PRN  
 sucralfate (CARAFATE) 1 gram tablet TAKE (1) TABLET BY MOUTH THREE TIMES DAILY prn 90 Tab 2  
 lidocaine-prilocaine (EMLA) topical cream Apply  to affected area as needed for Pain.  methIMAzole (TAPAZOLE) 5 mg tablet Take 1 Tab by mouth daily. Stop 10 mg 30 Tab 11  
 docusate sodium (COLACE) 100 mg capsule TAKE (1) CAPSULE BY MOUTH DAILY 31 Cap PRN  
 aluminum hydrox-magnesium carb (ACID GONE ANTACID)  mg/15 mL suspension Take 15 mL by mouth every six (6) hours as needed for Indigestion.  phenol throat spray (SORE THROAT) spray Take 1 Spray by mouth as needed.  ketoconazole (NIZORAL) 2 % topical cream APPLY TOPICALLY TO AFFECTED AREA ON FACE ONCE DAILY 30 g 98  CLEARLAX 17 gram/dose powder MIX 1 CAPFUL (SEE 17 gm LINE) IN A GLASS OF WATER & DRINK ONCE DAILY FOR CONSTIPATION. 510 g 98  
 benztropine (COGENTIN) 1 mg tablet Take 1 Tab by mouth two (2) times a day. 99  
 lamoTRIgine (LAMICTAL) 100 mg tablet Take 1 Tab by mouth daily.   80  
  chlorproMAZINE (THORAZINE) 100 mg tablet Take 100 mg by mouth two (2) times daily as needed.  mineral oil (FLEET MINERAL OIL) enema Insert  into rectum as needed for Constipation.  guaifenesin/dextromethorphan (ROBAFEN DM PO) Take 10 mL by mouth every six (6) hours as needed.  loperamide (ANTI-DIARRHEAL, LOPERAMIDE,) 2 mg capsule Take 4 mg by mouth as needed.  risperiDONE (RISPERDAL) 4 mg tablet Take 4 mg by mouth two (2) times a day.  acetaminophen (TYLENOL) 500 mg tablet Take 1,000 mg by mouth every six (6) hours as needed for Pain.  bisacodyl (BISCOLAX) 10 mg suppository Insert 10 mg into rectum as needed.  diphenhydrAMINE (BENADRYL) 25 mg tablet Take 25 mg by mouth every eight (8) hours as needed for Sleep.  magnesium hydroxide (GUZMÁN MILK OF MAGNESIA) 400 mg/5 mL suspension Take 30 mL by mouth daily as needed for Constipation.  hydrocortisone (CORTAID) 1 % topical cream Apply  to affected area two (2) times a day. use thin layer  magnesium citrate solution Take 296 mL by mouth as needed.  QUEtiapine (SEROQUEL) 200 mg tablet Take 1 Tab by mouth nightly. 30 Tab 2  
 OTHER Chopped diet, dx: J69.0 1 Each 0  
 OTHER Attends extra large pull-up, use as needed. Dx: 318.0 150 Each 11 Allergies Allergen Reactions  Chocolate [Cocoa] Unknown (comments)  Keppra [Levetiracetam] Unknown (comments)  Milk Unknown (comments)  Peanuts [Peanut Oil] Unknown (comments)  Pork/Porcine Containing Products Not Reported This Time  Tomato Not Reported This Time Past Medical History:  
Diagnosis Date  Anxiety  Depression  MR (mental retardation)  Schizoaffective disorder ([facility] [address] History reviewed. No pertinent surgical history. History reviewed. No pertinent family history. Social History Tobacco Use  Smoking status: Former Smoker Packs/day: 1.00 Years: 30.00 Pack years: 30.00 Types: Cigarettes  Smokeless tobacco: Never Used Substance Use Topics  Alcohol use: No  
 
 
Review of Systems Neurological: Negative for dizziness, tingling, seizures, loss of consciousness and headaches. Psychiatric/Behavioral: Negative for depression, hallucinations, memory loss, substance abuse and suicidal ideas. The patient is nervous/anxious. The patient does not have insomnia. Objective:  
Vital Signs: (As obtained by patient/caregiver at home) There were no vitals taken for this visit. [INSTRUCTIONS:  \"[x]\" Indicates a positive item  \"[]\" Indicates a negative item  -- DELETE ALL ITEMS NOT EXAMINED] Constitutional: [x] Appears well-developed and well-nourished [x] No apparent distress   
  [] Abnormal - Mental status: [x] Alert and awake  [x] Oriented to person/place/time [x] Able to follow commands   
[] Abnormal - Eyes:   EOM    [x]  Normal    [] Abnormal -  
Sclera  [x]  Normal    [] Abnormal - 
        Discharge [x]  None visible   [] Abnormal - HENT: [x] Normocephalic, atraumatic  [] Abnormal -  
[x] Mouth/Throat: Mucous membranes are moist 
 
External Ears [x] Normal  [] Abnormal - Neck: [x] No visualized mass [] Abnormal - Pulmonary/Chest: [x] Respiratory effort normal   [x] No visualized signs of difficulty breathing or respiratory distress 
      [] Abnormal - Musculoskeletal:   [x] Normal gait with no signs of ataxia [x] Normal range of motion of neck [] Abnormal -  
 
Neurological:        [x] No Facial Asymmetry (Cranial nerve 7 motor function) (limited exam due to video visit) [x] No gaze palsy  
     [] Abnormal -   
     
Skin:        [x] No significant exanthematous lesions or discoloration noted on facial skin   
     [] Abnormal - Psychiatric:       [x] Normal Affect [] Abnormal -  
     [x] No Hallucinations Other pertinent observable physical exam findings:- 
 
Continue Depakote, Vimpat for prevention of seizures. These are working well for him. Continue to keep him active and engaged. being well cared for. We discussed the expected course, resolution and complications of the diagnosis(es) in detail. Medication risks, benefits, costs, interactions, and alternatives were discussed as indicated. I advised him to contact the office if his condition worsens, changes or fails to improve as anticipated. He expressed understanding with the diagnosis(es) and plan. Binu Davis is a 54 y.o. male who was evaluated by a video visit encounter for concerns as above. Patient identification was verified prior to start of the visit. A caregiver was present when appropriate. Due to this being a TeleHealth encounter (During YMOTY-42 public health emergency), evaluation of the following organ systems was limited: Vitals/Constitutional/EENT/Resp/CV/GI//MS/Neuro/Skin/Heme-Lymph-Imm. Pursuant to the emergency declaration under the [facility], 1135 waiver authority and the [employer] Act, this Virtual  Visit was conducted, with patient's (and/or legal guardian's) consent, to reduce the patient's risk of exposure to COVID-19 and provide necessary medical care. Services were provided through a video synchronous discussion virtually to substitute for in-person clinic visit. Patient and provider were located at their individual homes.  
 
 
Aracely Sellers NP

## 2021-10-18 NOTE — PROCEDURES
Impression: Presence of intraocular lens: Z96.1. Bilateral. Plan: Continue to monitor. PROCEDURE: ROUTINE INPATIENT EEG 
NAME:   [facility] ACCOUNT NUMBER : [de-identified] MRN:   [id number] DATE OF SERVICE: 3/5/19 HISTORY/INDICATION: Pt is a 50yo male with h/o schizophrenia and epilepsy with recent behavioral issues. EEG is performed to assess epilepsy. MEDICATIONS:  
Current Outpatient Medications Medication Sig Dispense Refill  methIMAzole (TAPAZOLE) 10 mg tablet Take 2 Tabs by mouth every morning. 180 Tab 3  
 ketoconazole (NIZORAL) 2 % topical cream Apply  to affected area daily.  risperiDONE (RISPERDAL) 1 mg tablet Take 1 mg by mouth two (2) times a day.  risperiDONE (RISPERDAL) 2 mg tablet Take 1 mg by mouth nightly.  acetaminophen (TYLENOL) 500 mg tablet Take 1,000 mg by mouth every six (6) hours as needed for Pain.  bisacodyl (BISCOLAX) 10 mg suppository Insert 10 mg into rectum as needed.  diphenhydrAMINE (BENADRYL) 25 mg tablet Take 25 mg by mouth every eight (8) hours as needed for Sleep.  magnesium hydroxide (GUZMÁN MILK OF MAGNESIA) 400 mg/5 mL suspension Take 30 mL by mouth daily as needed for Constipation.  hydrocortisone (CORTAID) 1 % topical cream Apply  to affected area two (2) times a day. use thin layer  magnesium citrate solution Take 296 mL by mouth as needed.  pantoprazole (PROTONIX) 40 mg tablet TAKE 1 TABLET BY MOUTH DAILY 28 Tab PRN  polyethylene glycol (MIRALAX) 17 gram/dose powder MIX 1 CAPFUL (SEE 17 gm LINE) IN A GLASS OF WATER & DRINK ONCE DAILY FOR CONSTIPATION. 527 g 98  
 lacosamide (VIMPAT) 100 mg tab tablet Take 1 Tab by mouth two (2) times a day. Max Daily Amount: 200 mg. 60 Tab 5  divalproex DR (DEPAKOTE) 500 mg tablet Take 250 mg by mouth three (3) times daily.  clobetasol (CLOBEX) 0.05 % lotion APPLY TO AFFECTED AREA 2 TIMES A DAY AS DIRECTED. 50 mL 0  
 QUEtiapine (SEROQUEL) 200 mg tablet Take 1 Tab by mouth nightly.  30 Tab 2  
  sertraline (ZOLOFT) 100 mg tablet Take 1 Tab by mouth daily. In the morning (Patient taking differently: Take 200 mg by mouth daily. In the morning) 30 Tab 2  
 sucralfate (CARAFATE) 1 gram tablet TAKE (1) TABLET BY MOUTH THREE TIMES DAILY prn 90 Tab 2  
 omeprazole (PRILOSEC) 20 mg capsule TAKE ONE CAPSULE BY MOUTH DAILY 30 Cap 5  
 LORazepam (ATIVAN) 1 mg tablet Take 1 Tab by mouth every six (6) hours as needed for Anxiety. Max Daily Amount: 4 mg. 120 Tab 0  
 polyethylene glycol (MIRALAX) 17 gram packet Take 1 Packet by mouth every Monday, Wednesday, Friday. (Patient taking differently: Take 17 g by mouth daily.) 12 Each 11  
 OTHER Chopped diet, dx: J69.0 1 Each 0  
 amLODIPine-benazepril (LOTREL) 10-20 mg per capsule TAKE ONE CAPSULE BY MOUTH DAILY 30 Cap 5  
 multivitamin, tx-iron-ca-min (THERAPY M) 9 mg iron-400 mcg tab tablet TAKE ONE TABLET BY MOUTH DAILY. 30 Tab 11  
 aspirin delayed-release 81 mg tablet TAKE ONE TABLET BY MOUTH DAILY. 30 Tab 11  
 OTHER Attends extra large pull-up, use as needed. Dx: 318.0 150 Each 11 CONDITIONS OF RECORDING: This is a routine 21-channel EEG recording performed in accordance with the international 10-20 system with one channel devoted to limited EKG. This study was done during states of wakefulness and sleep. Photic stimulation and hyperventilation were performed as activating procedures. DESCRIPTION:  
Upon maximal arousal there is a low voltage fast frequency posterior dominant rhythm. This activity is symmetric over the bilateral posterior derivations and attenuates with eye opening. Photic stimulation and hyperventilation do not significantly alter the tracing. Normal sleep architecture is seen with stage I sleep recognized by the presence of symmetric vertex waves. There are no focal abnormalities, epileptiform discharges, or electrographic seizures seen.   
 
INTERPRETATION: Normal awake and asleep EEG 
 
 CLINICAL CORRELATION: A normal EEG does not definitively exclude a diagnosis of epilepsy if clinical suspicion is high consider sleep deprived EEG.  
 
Cassandra Ervin MD